# Patient Record
Sex: MALE | Race: WHITE
[De-identification: names, ages, dates, MRNs, and addresses within clinical notes are randomized per-mention and may not be internally consistent; named-entity substitution may affect disease eponyms.]

---

## 2019-11-07 ENCOUNTER — HEALTH MAINTENANCE LETTER (OUTPATIENT)
Age: 29
End: 2019-11-07

## 2020-12-06 ENCOUNTER — HEALTH MAINTENANCE LETTER (OUTPATIENT)
Age: 30
End: 2020-12-06

## 2021-09-25 ENCOUNTER — HEALTH MAINTENANCE LETTER (OUTPATIENT)
Age: 31
End: 2021-09-25

## 2022-01-15 ENCOUNTER — HEALTH MAINTENANCE LETTER (OUTPATIENT)
Age: 32
End: 2022-01-15

## 2023-01-07 ENCOUNTER — HEALTH MAINTENANCE LETTER (OUTPATIENT)
Age: 33
End: 2023-01-07

## 2023-04-22 ENCOUNTER — HEALTH MAINTENANCE LETTER (OUTPATIENT)
Age: 33
End: 2023-04-22

## 2024-12-07 ENCOUNTER — ANESTHESIA EVENT (OUTPATIENT)
Dept: SURGERY | Facility: CLINIC | Age: 34
End: 2024-12-07
Payer: COMMERCIAL

## 2024-12-07 ENCOUNTER — ANESTHESIA (OUTPATIENT)
Dept: SURGERY | Facility: CLINIC | Age: 34
End: 2024-12-07
Payer: COMMERCIAL

## 2024-12-07 ENCOUNTER — HOSPITAL ENCOUNTER (OUTPATIENT)
Facility: CLINIC | Age: 34
Discharge: HOME | End: 2024-12-07
Attending: STUDENT IN AN ORGANIZED HEALTH CARE EDUCATION/TRAINING PROGRAM
Payer: COMMERCIAL

## 2024-12-07 ENCOUNTER — HOSPITAL ENCOUNTER (EMERGENCY)
Facility: CLINIC | Age: 34
End: 2024-12-07
Payer: MEDICAID

## 2024-12-07 VITALS
DIASTOLIC BLOOD PRESSURE: 67 MMHG | BODY MASS INDEX: 26.72 KG/M2 | HEIGHT: 72 IN | WEIGHT: 197.3 LBS | TEMPERATURE: 97.9 F | SYSTOLIC BLOOD PRESSURE: 110 MMHG | RESPIRATION RATE: 14 BRPM | OXYGEN SATURATION: 94 % | HEART RATE: 118 BPM

## 2024-12-07 DIAGNOSIS — S05.32XD RUPTURED GLOBE OF LEFT EYE, SUBSEQUENT ENCOUNTER: ICD-10-CM

## 2024-12-07 DIAGNOSIS — H27.10 LENS DISLOCATION: ICD-10-CM

## 2024-12-07 PROCEDURE — 250N000011 HC RX IP 250 OP 636: Performed by: NURSE ANESTHETIST, CERTIFIED REGISTERED

## 2024-12-07 PROCEDURE — 360N000077 HC SURGERY LEVEL 4, PER MIN

## 2024-12-07 PROCEDURE — 370N000017 HC ANESTHESIA TECHNICAL FEE, PER MIN

## 2024-12-07 PROCEDURE — 250N000011 HC RX IP 250 OP 636: Performed by: ANESTHESIOLOGY

## 2024-12-07 PROCEDURE — 250N000011 HC RX IP 250 OP 636

## 2024-12-07 PROCEDURE — 250N000009 HC RX 250: Performed by: NURSE ANESTHETIST, CERTIFIED REGISTERED

## 2024-12-07 PROCEDURE — 65285 REPAIR OF EYE WOUND: CPT | Mod: LT

## 2024-12-07 PROCEDURE — 250N000009 HC RX 250

## 2024-12-07 PROCEDURE — 90471 IMMUNIZATION ADMIN: CPT

## 2024-12-07 PROCEDURE — 710N000012 HC RECOVERY PHASE 2, PER MINUTE

## 2024-12-07 PROCEDURE — 90714 TD VACC NO PRESV 7 YRS+ IM: CPT

## 2024-12-07 PROCEDURE — 258N000003 HC RX IP 258 OP 636: Performed by: NURSE ANESTHETIST, CERTIFIED REGISTERED

## 2024-12-07 PROCEDURE — 250N000011 HC RX IP 250 OP 636: Mod: JZ

## 2024-12-07 PROCEDURE — 99285 EMERGENCY DEPT VISIT HI MDM: CPT | Performed by: EMERGENCY MEDICINE

## 2024-12-07 PROCEDURE — 710N000010 HC RECOVERY PHASE 1, LEVEL 2, PER MIN

## 2024-12-07 PROCEDURE — 250N000025 HC SEVOFLURANE, PER MIN

## 2024-12-07 PROCEDURE — 99291 CRITICAL CARE FIRST HOUR: CPT | Performed by: EMERGENCY MEDICINE

## 2024-12-07 PROCEDURE — 999N000141 HC STATISTIC PRE-PROCEDURE NURSING ASSESSMENT

## 2024-12-07 PROCEDURE — 272N000001 HC OR GENERAL SUPPLY STERILE

## 2024-12-07 RX ORDER — LEVOFLOXACIN 5 MG/ML
500 INJECTION, SOLUTION INTRAVENOUS ONCE
Status: COMPLETED | OUTPATIENT
Start: 2024-12-07 | End: 2024-12-07

## 2024-12-07 RX ORDER — HYDROMORPHONE HYDROCHLORIDE 1 MG/ML
0.2 INJECTION, SOLUTION INTRAMUSCULAR; INTRAVENOUS; SUBCUTANEOUS EVERY 5 MIN PRN
Status: DISCONTINUED | OUTPATIENT
Start: 2024-12-07 | End: 2024-12-07 | Stop reason: HOSPADM

## 2024-12-07 RX ORDER — DEXAMETHASONE SODIUM PHOSPHATE 4 MG/ML
INJECTION, SOLUTION INTRA-ARTICULAR; INTRALESIONAL; INTRAMUSCULAR; INTRAVENOUS; SOFT TISSUE PRN
Status: DISCONTINUED | OUTPATIENT
Start: 2024-12-07 | End: 2024-12-07

## 2024-12-07 RX ORDER — PREDNISOLONE ACETATE 10 MG/ML
1 SUSPENSION/ DROPS OPHTHALMIC 4 TIMES DAILY
Qty: 5 ML | Refills: 0 | Status: SHIPPED | OUTPATIENT
Start: 2024-12-07

## 2024-12-07 RX ORDER — HYDROMORPHONE HYDROCHLORIDE 1 MG/ML
0.4 INJECTION, SOLUTION INTRAMUSCULAR; INTRAVENOUS; SUBCUTANEOUS EVERY 5 MIN PRN
Status: DISCONTINUED | OUTPATIENT
Start: 2024-12-07 | End: 2024-12-07 | Stop reason: HOSPADM

## 2024-12-07 RX ORDER — FENTANYL CITRATE 50 UG/ML
50 INJECTION, SOLUTION INTRAMUSCULAR; INTRAVENOUS EVERY 5 MIN PRN
Status: DISCONTINUED | OUTPATIENT
Start: 2024-12-07 | End: 2024-12-07 | Stop reason: HOSPADM

## 2024-12-07 RX ORDER — NALOXONE HYDROCHLORIDE 0.4 MG/ML
0.1 INJECTION, SOLUTION INTRAMUSCULAR; INTRAVENOUS; SUBCUTANEOUS
Status: DISCONTINUED | OUTPATIENT
Start: 2024-12-07 | End: 2024-12-07 | Stop reason: HOSPADM

## 2024-12-07 RX ORDER — FENTANYL CITRATE 50 UG/ML
25 INJECTION, SOLUTION INTRAMUSCULAR; INTRAVENOUS
Status: DISCONTINUED | OUTPATIENT
Start: 2024-12-07 | End: 2024-12-07 | Stop reason: HOSPADM

## 2024-12-07 RX ORDER — FENTANYL CITRATE 50 UG/ML
25 INJECTION, SOLUTION INTRAMUSCULAR; INTRAVENOUS EVERY 5 MIN PRN
Status: DISCONTINUED | OUTPATIENT
Start: 2024-12-07 | End: 2024-12-07 | Stop reason: HOSPADM

## 2024-12-07 RX ORDER — SODIUM CHLORIDE, SODIUM LACTATE, POTASSIUM CHLORIDE, CALCIUM CHLORIDE 600; 310; 30; 20 MG/100ML; MG/100ML; MG/100ML; MG/100ML
INJECTION, SOLUTION INTRAVENOUS CONTINUOUS
Status: DISCONTINUED | OUTPATIENT
Start: 2024-12-07 | End: 2024-12-07 | Stop reason: HOSPADM

## 2024-12-07 RX ORDER — ONDANSETRON 2 MG/ML
4 INJECTION INTRAMUSCULAR; INTRAVENOUS EVERY 30 MIN PRN
Status: DISCONTINUED | OUTPATIENT
Start: 2024-12-07 | End: 2024-12-07 | Stop reason: HOSPADM

## 2024-12-07 RX ORDER — ATROPINE SULFATE 10 MG/ML
SOLUTION/ DROPS OPHTHALMIC PRN
Status: DISCONTINUED | OUTPATIENT
Start: 2024-12-07 | End: 2024-12-07 | Stop reason: HOSPADM

## 2024-12-07 RX ORDER — ONDANSETRON 2 MG/ML
INJECTION INTRAMUSCULAR; INTRAVENOUS PRN
Status: DISCONTINUED | OUTPATIENT
Start: 2024-12-07 | End: 2024-12-07

## 2024-12-07 RX ORDER — OXYCODONE HYDROCHLORIDE 5 MG/1
5 TABLET ORAL
Status: DISCONTINUED | OUTPATIENT
Start: 2024-12-07 | End: 2024-12-07 | Stop reason: HOSPADM

## 2024-12-07 RX ORDER — RISPERIDONE 2 MG/1
2 TABLET ORAL EVERY MORNING
COMMUNITY

## 2024-12-07 RX ORDER — FENTANYL CITRATE 50 UG/ML
INJECTION, SOLUTION INTRAMUSCULAR; INTRAVENOUS PRN
Status: DISCONTINUED | OUTPATIENT
Start: 2024-12-07 | End: 2024-12-07

## 2024-12-07 RX ORDER — RISPERIDONE 1 MG/1
1 TABLET ORAL AT BEDTIME
COMMUNITY

## 2024-12-07 RX ORDER — ATROPINE SULFATE 10 MG/ML
1 SOLUTION/ DROPS OPHTHALMIC 2 TIMES DAILY
Qty: 5 ML | Refills: 0 | Status: SHIPPED | OUTPATIENT
Start: 2024-12-07

## 2024-12-07 RX ORDER — MOXIFLOXACIN HYDROCHLORIDE 400 MG/250ML
400 INJECTION, SOLUTION INTRAVENOUS ONCE
Status: DISCONTINUED | OUTPATIENT
Start: 2024-12-07 | End: 2024-12-07

## 2024-12-07 RX ORDER — ONDANSETRON 4 MG/1
4 TABLET, ORALLY DISINTEGRATING ORAL EVERY 8 HOURS PRN
Qty: 12 TABLET | Refills: 0 | Status: SHIPPED | OUTPATIENT
Start: 2024-12-07

## 2024-12-07 RX ORDER — CYCLOPENTOLAT/TROPIC/PHENYLEPH 1%-1%-2.5%
DROPS (EA) OPHTHALMIC (EYE) PRN
Status: DISCONTINUED | OUTPATIENT
Start: 2024-12-07 | End: 2024-12-07 | Stop reason: HOSPADM

## 2024-12-07 RX ORDER — ONDANSETRON 4 MG/1
4 TABLET, ORALLY DISINTEGRATING ORAL EVERY 30 MIN PRN
Status: DISCONTINUED | OUTPATIENT
Start: 2024-12-07 | End: 2024-12-07 | Stop reason: HOSPADM

## 2024-12-07 RX ORDER — MOXIFLOXACIN 5 MG/ML
1 SOLUTION/ DROPS OPHTHALMIC 4 TIMES DAILY
Qty: 3 ML | Refills: 0 | Status: SHIPPED | OUTPATIENT
Start: 2024-12-07 | End: 2024-12-23

## 2024-12-07 RX ORDER — KETOROLAC TROMETHAMINE 30 MG/ML
INJECTION, SOLUTION INTRAMUSCULAR; INTRAVENOUS PRN
Status: DISCONTINUED | OUTPATIENT
Start: 2024-12-07 | End: 2024-12-07

## 2024-12-07 RX ORDER — DEXMEDETOMIDINE HYDROCHLORIDE 4 UG/ML
INJECTION, SOLUTION INTRAVENOUS PRN
Status: DISCONTINUED | OUTPATIENT
Start: 2024-12-07 | End: 2024-12-07

## 2024-12-07 RX ORDER — SODIUM CHLORIDE, SODIUM LACTATE, POTASSIUM CHLORIDE, CALCIUM CHLORIDE 600; 310; 30; 20 MG/100ML; MG/100ML; MG/100ML; MG/100ML
INJECTION, SOLUTION INTRAVENOUS CONTINUOUS PRN
Status: DISCONTINUED | OUTPATIENT
Start: 2024-12-07 | End: 2024-12-07

## 2024-12-07 RX ORDER — FLUVOXAMINE MALEATE 50 MG
50 TABLET ORAL AT BEDTIME
COMMUNITY

## 2024-12-07 RX ORDER — DEXAMETHASONE SODIUM PHOSPHATE 4 MG/ML
4 INJECTION, SOLUTION INTRA-ARTICULAR; INTRALESIONAL; INTRAMUSCULAR; INTRAVENOUS; SOFT TISSUE
Status: DISCONTINUED | OUTPATIENT
Start: 2024-12-07 | End: 2024-12-07 | Stop reason: HOSPADM

## 2024-12-07 RX ORDER — BALANCED SALT SOLUTION 6.4; .75; .48; .3; 3.9; 1.7 MG/ML; MG/ML; MG/ML; MG/ML; MG/ML; MG/ML
SOLUTION OPHTHALMIC PRN
Status: DISCONTINUED | OUTPATIENT
Start: 2024-12-07 | End: 2024-12-07 | Stop reason: HOSPADM

## 2024-12-07 RX ORDER — PROPOFOL 10 MG/ML
INJECTION, EMULSION INTRAVENOUS PRN
Status: DISCONTINUED | OUTPATIENT
Start: 2024-12-07 | End: 2024-12-07

## 2024-12-07 RX ORDER — MECLIZINE HCL 25MG 25 MG/1
25 TABLET, CHEWABLE ORAL ONCE
Status: DISCONTINUED | OUTPATIENT
Start: 2024-12-07 | End: 2024-12-07 | Stop reason: HOSPADM

## 2024-12-07 RX ORDER — LIDOCAINE HYDROCHLORIDE 20 MG/ML
INJECTION, SOLUTION INFILTRATION; PERINEURAL PRN
Status: DISCONTINUED | OUTPATIENT
Start: 2024-12-07 | End: 2024-12-07

## 2024-12-07 RX ORDER — OXYCODONE HYDROCHLORIDE 10 MG/1
10 TABLET ORAL
Status: DISCONTINUED | OUTPATIENT
Start: 2024-12-07 | End: 2024-12-07 | Stop reason: HOSPADM

## 2024-12-07 RX ADMIN — DEXMEDETOMIDINE HYDROCHLORIDE 8 MCG: 100 INJECTION, SOLUTION INTRAVENOUS at 07:54

## 2024-12-07 RX ADMIN — SODIUM CHLORIDE, POTASSIUM CHLORIDE, SODIUM LACTATE AND CALCIUM CHLORIDE: 600; 310; 30; 20 INJECTION, SOLUTION INTRAVENOUS at 05:46

## 2024-12-07 RX ADMIN — SUGAMMADEX 200 MG: 100 INJECTION, SOLUTION INTRAVENOUS at 08:34

## 2024-12-07 RX ADMIN — ONDANSETRON 4 MG: 2 INJECTION INTRAMUSCULAR; INTRAVENOUS at 09:08

## 2024-12-07 RX ADMIN — FENTANYL CITRATE 100 MCG: 50 INJECTION INTRAMUSCULAR; INTRAVENOUS at 05:53

## 2024-12-07 RX ADMIN — CLOSTRIDIUM TETANI TOXOID ANTIGEN (FORMALDEHYDE INACTIVATED) AND CORYNEBACTERIUM DIPHTHERIAE TOXOID ANTIGEN (FORMALDEHYDE INACTIVATED) 0.5 ML: 5; 2 INJECTION, SUSPENSION INTRAMUSCULAR at 09:18

## 2024-12-07 RX ADMIN — LEVOFLOXACIN 500 MG: 5 INJECTION, SOLUTION INTRAVENOUS at 06:09

## 2024-12-07 RX ADMIN — KETOROLAC TROMETHAMINE 30 MG: 30 INJECTION, SOLUTION INTRAMUSCULAR at 08:08

## 2024-12-07 RX ADMIN — DEXAMETHASONE SODIUM PHOSPHATE 8 MG: 4 INJECTION, SOLUTION INTRAMUSCULAR; INTRAVENOUS at 06:32

## 2024-12-07 RX ADMIN — LIDOCAINE HYDROCHLORIDE 100 MG: 20 INJECTION, SOLUTION INFILTRATION; PERINEURAL at 05:53

## 2024-12-07 RX ADMIN — DEXMEDETOMIDINE HYDROCHLORIDE 4 MCG: 100 INJECTION, SOLUTION INTRAVENOUS at 08:07

## 2024-12-07 RX ADMIN — Medication 50 MG: at 05:53

## 2024-12-07 RX ADMIN — ONDANSETRON 4 MG: 2 INJECTION INTRAMUSCULAR; INTRAVENOUS at 07:49

## 2024-12-07 RX ADMIN — PROCHLORPERAZINE EDISYLATE 5 MG: 5 INJECTION INTRAMUSCULAR; INTRAVENOUS at 10:47

## 2024-12-07 RX ADMIN — DEXMEDETOMIDINE HYDROCHLORIDE 8 MCG: 100 INJECTION, SOLUTION INTRAVENOUS at 08:12

## 2024-12-07 RX ADMIN — MIDAZOLAM 2 MG: 1 INJECTION INTRAMUSCULAR; INTRAVENOUS at 05:46

## 2024-12-07 RX ADMIN — PROPOFOL 200 MG: 10 INJECTION, EMULSION INTRAVENOUS at 05:53

## 2024-12-07 RX ADMIN — FENTANYL CITRATE 50 MCG: 50 INJECTION INTRAMUSCULAR; INTRAVENOUS at 08:32

## 2024-12-07 RX ADMIN — PROCHLORPERAZINE EDISYLATE 5 MG: 5 INJECTION INTRAMUSCULAR; INTRAVENOUS at 09:56

## 2024-12-07 ASSESSMENT — ACTIVITIES OF DAILY LIVING (ADL)
ADLS_ACUITY_SCORE: 47
ADLS_ACUITY_SCORE: 41
ADLS_ACUITY_SCORE: 47
ADLS_ACUITY_SCORE: 41
ADLS_ACUITY_SCORE: 41
ADLS_ACUITY_SCORE: 47
ADLS_ACUITY_SCORE: 41

## 2024-12-07 ASSESSMENT — COLUMBIA-SUICIDE SEVERITY RATING SCALE - C-SSRS
6. HAVE YOU EVER DONE ANYTHING, STARTED TO DO ANYTHING, OR PREPARED TO DO ANYTHING TO END YOUR LIFE?: NO
1. IN THE PAST MONTH, HAVE YOU WISHED YOU WERE DEAD OR WISHED YOU COULD GO TO SLEEP AND NOT WAKE UP?: NO
2. HAVE YOU ACTUALLY HAD ANY THOUGHTS OF KILLING YOURSELF IN THE PAST MONTH?: NO

## 2024-12-07 NOTE — CONSULTS
OPHTHALMOLOGY CONSULT NOTE  12/07/24  Physician Attestation   I saw this patient with the resident on Dec 7, 2024 and agree with the findings and plan of care as documented in the note with the following additions/modifications:    Assessment:   #Ruptured globe, left eye - corneal laceration with iris prolapse    Plan:  -Start moxifloxacin 400 mg IV   -Zofran 4 mg q 4 hours for nausea   -Mtz shield over eye  -Avoid eye rubbing, straining or pressure on eye  -Tetanus vaccine ( to be injected in the operating room due to patients anxiety)  -To OR for surgical repair, consent was obtained and risks and benefits were described in detail to the Mom and Dad (Laurie and Alonzo) who are the medical decision makers for Guilherme Silverio  -Intravitreal antibiotics, vancomycin and ceftazidime, to be administered intraoperatively  -Will exam patient later in the afternoon on 12/7/2024  -Instructions to be provided by surgeons after the procedure - Will see the patient tomorrow at 11 am for 1st post operative visit    We discussed return precautions and modes of contacting our service for any changing symptoms or other questions/concerns.     Yves Figueroa MD   Fellow, Neuro-ophthalmology     Patient: Guilherme Silverio  ASSESSMENT/PLAN:     Guilherme Silverio is a 34 year old male who presented with glass injury to the left eye.    #Ruptured globe, left eye  Patient broke a glass and a shard went into this left eye, the right eye was unaffected. CT at outside hospital ([facility]) shows intact globe, no foreign body, but no identifiable lens. VA was 20/20 right eye and possibly count fingers in the left eye, IOP deferred due to patient discomfort, EOMs grossly full, patient lacked understanding of confrontational fields. Anterior segment exam of the right eye with a deep chamber and no stain uptake - left eye with full thickness superior corneal laceration and completely flat chamber, was unable to get fully view of  bulbar conjunctiva to assess scleral related injury.    PLAN:   -Start moxifloxacin 400 mg IV   -Intravitreal antibiotics, vancomycin and ceftazidime, to be administered intraoperatively  -Zofran 4 mg q 4 hours for nausea   -Mtz shield over eye  -Avoid eye rubbing or pressure on eye  -Avoid straining  -Tetanus vaccine if not up to date  -To OR for surgical repair, consent was obtained and risks and benefits were described in detail to the Mom and Dad (Laurie and Alonzo) who are the medical decision makers for Guilherme Silverio.  -Instructions to be provided by surgeons after the procedure  -Will exam patient later in the afternoon on 12/7/2024    It is our pleasure to participate in this patient's care and treatment. Please contact us with any further questions or concerns.    Discussed with Dr. Yves Chinchilla who agreed with this assessment and plan.     Ophthalmology will continue to follow inpatient. Please contact ophthalmologist on call with any questions or concerns. We appreciate your care of this patient.    Thank you for entrusting us with your care  Reno Bales MD, PGY2  Ophthalmology Resident  [facility]    HISTORY OF PRESENTING ILLNESS:     Guilherme Silverio is a 34 year old male who presented on 12/7/2024 after transfer from outside hospital. Around 7:30 pm on 12/7/2024 the patient smashed a drinking glass and some of went into his eye. Went to outside ED in [facility] where a CT was performed, this did not show a foreign body or ruptured globe. He was sent over here for further evaluation. Pain in the left eye - family said he has never had problems in the past.    10+ review of systems were otherwise negative except for that which has been stated above.    OCULAR/MEDICAL/SURGICAL HISTORIES:     Past Ocular History:   Last eye exam: over 5 years ago   Previously diagnosed ocular conditions: None  Prior eye surgery/laser: None  Contact lens wear: None  Glasses: None  Eyedrops:  None    Pertinent Systemic Medications:   None pertinent    Allergies:  Mom denies that patient has any allergy to antibiotics, latex, or anesthesia     Past Medical History:  Past Medical History:   Diagnosis Date    Acne     Autism     Constipation     Diarrhea     Rectal prolapse        Past Surgical History:   Past Surgical History:   Procedure Laterality Date    DENTAL SURGERY      HC TOOTH EXTRACTION W/FORCEP      NO HISTORY OF SURGERY      PE TUBES      ZZHC LAP,INGUINAL HERNIA REPR,RECUR         Family History:  No history of macular degeneration or glaucoma    Social History:  No tobacco use    EXAMINATION:     Slit Lamp and Fundus Exam       External Exam         Right Left    External Normal Normal              Slit Lamp Exam         Right Left    Lids/Lashes Normal Normal    Conjunctiva/Sclera White and quiet Difficult view    Cornea Clear Superior laceration (see picture)    Anterior Chamber Deep and quiet flat    Iris Round and reactive     Lens Clear     Anterior Vitreous Normal               Fundus Exam       Fundus to be examined intraoperatively                    Labs/Studies/Imaging Performed    CT Head 12/6/2024  Outside CT of Head without formal read here at [facility] - The globe appears intact, though it appears the lens is missing.      Reno Bales MD  Resident Physician, PGY2  Department of Ophthalmology  12/07/24 3:10 AM

## 2024-12-07 NOTE — ED PROVIDER NOTES
[facility] EMERGENCY DEPARTMENT ([facility])    12/07/24      ED PROVIDER NOTE    History     Chief Complaint   Patient presents with    Eye Injury     Was referred here from [facility] due to L eye injury. Pt broke a glasses, a piece of glasses may get in the eye.      The history is provided by medical records and a parent.     Guilherme Silverio is a 34 year old male with a history of active autistic disorder who presents to the ED for evaluation of a left eye injury.  Patient was initially evaluated at [facility] where a CT was done which showed a missing lens in the eye.  History is provided by parents as patient is unable to provide history due to developmental delay.  Parents report that he threw a glass which shattered and they believe he got a shard of glass in his eye.  He has had pain and swelling surrounding the eye.  He has had normal vision in both eyes up until now.  He is not quite able to describe his vision, but from what the parents were able to ascertain he is having difficulty reading letters but is able to see colors still.  He last ate around 11 PM.  He is not anticoagulated.        Past Medical History  Past Medical History:   Diagnosis Date    Acne     Autism     Constipation     Diarrhea     Rectal prolapse      Past Surgical History:   Procedure Laterality Date    DENTAL SURGERY      HC LAP,INGUINAL HERNIA REPR,RECUR      HC TOOTH EXTRACTION W/FORCEP      NO HISTORY OF SURGERY      PE TUBES       ARIPiprazole (ABILIFY) 15 MG tablet  clindamycin (CLEOCIN T) 1 % external solution  diazepam (VALIUM) 5 MG tablet  gabapentin (NEURONTIN) 300 MG capsule  melatonin 3 MG tablet  sertraline (ZOLOFT) 50 MG tablet  tretinoin (RETIN-A) 0.01 % gel  ziprasidone (GEODON) 20 MG capsule  ziprasidone (GEODON) 40 MG capsule      No Known Allergies  Family History  No family history on file.  Social History   Social History     Tobacco Use    Smoking status: Never   Substance Use Topics  "   Alcohol use: No    Drug use: No      Past medical history, past surgical history, medications, allergies, family history, and social history were reviewed with the patient. No additional pertinent items.     A medically appropriate review of systems was performed with pertinent positives and negatives noted in the HPI, and all other systems negative.    Physical Exam      Physical Exam  Vitals and nursing note reviewed.   Constitutional:       General: He is not in acute distress.     Appearance: Normal appearance. He is not toxic-appearing.   HENT:      Head: Atraumatic.   Eyes:      General: No scleral icterus.     Conjunctiva/sclera: Conjunctivae normal.   Cardiovascular:      Rate and Rhythm: Normal rate.      Heart sounds: Normal heart sounds.   Pulmonary:      Effort: Pulmonary effort is normal. No respiratory distress.      Breath sounds: Normal breath sounds.   Abdominal:      Palpations: Abdomen is soft.      Tenderness: There is no abdominal tenderness.   Musculoskeletal:         General: No deformity.      Cervical back: Neck supple.   Skin:     General: Skin is warm.   Neurological:      Mental Status: He is alert.       ED Course, Procedures, & Data      Procedures       {ED Course Selections (Optional):135283}  {ED Sepsis CMS Documentation (Optional):114579::\" \"}       No results found for any visits on 12/07/24.  Medications - No data to display  Labs Ordered and Resulted from Time of ED Arrival to Time of ED Departure - No data to display  No orders to display          {Critical Care Performed?:615110}    Assessment & Plan    Guilherme Silverio is a 34 year old male with a history of active autistic disorder who presents to the ED from [facility] for evaluation of a left eye injury.     I have reviewed the nursing notes. I have reviewed the findings, diagnosis, plan and need for follow up with the patient.    New Prescriptions    No medications on file       Final diagnoses:   None     I, " Jeanne Beaver, am serving as a trained medical scribe to document services personally performed by Ying Meier MD based on the provider's statements to me on December 7, 2024.  This document has been checked and approved by the attending provider.    I, Ying Meier MD, was physically present and have reviewed and verified the accuracy of this note documented by Jeanne Beaver medical scribe.      Ying Meier MD  [facility] EMERGENCY DEPARTMENT  12/7/2024     Ying Meier MD  12/13/24 0843       Ying Meier MD  12/13/24 0844

## 2024-12-07 NOTE — BRIEF OP NOTE
[facility]    Brief Operative Note    Pre-operative diagnosis: Ruptured globe of left eye, initial encounter [S05.32XA]  Post-operative diagnosis Same as pre-operative diagnosis    Procedure: Repair ruptured globe, Left - Eye    Surgeon: Surgeons and Role:     * Yves Huff MD - Primary     * Eliud Eric MD - Resident - Assisting  Anesthesia: General   Estimated Blood Loss: Minimal    Drains: None  Specimens: * No specimens in log *  Findings:   Irregular corneal laceration with iris in the wound  Complications: None.  Implants: * No implants in log *        Eliud Eric MD  Resident Physician, PGY-3  Department of Ophthalmology

## 2024-12-07 NOTE — ANESTHESIA PROCEDURE NOTES
Airway       Patient location during procedure: OR       Procedure Start/Stop Times: 12/7/2024 5:55 AM  Staff -        CRNA: Toña yRan APRN CRNA       Performed By: CRNA  Consent for Airway        Urgency: elective  Indications and Patient Condition       Indications for airway management: juan-procedural       Induction type:intravenous       Mask difficulty assessment: 1 - vent by mask    Final Airway Details       Final airway type: endotracheal airway       Successful airway: ETT - single  Endotracheal Airway Details        ETT size (mm): 8.0       Cuffed: yes       Successful intubation technique: direct laryngoscopy and video laryngoscopy       VL Blade Size: Glidescope 4       Grade View of Cords: 1       Adjucts: stylet       Position: Right       Measured from: gums/teeth       Secured at (cm): 25       Bite block used: None    Post intubation assessment        Placement verified by: capnometry, equal breath sounds and chest rise        Number of attempts at approach: 1       Secured with: pink tape       Ease of procedure: easy       Dentition: Intact and Unchanged    Medication(s) Administered   Medication Administration Time: 12/7/2024 5:55 AM

## 2024-12-07 NOTE — ANESTHESIA CARE TRANSFER NOTE
Patient: Guilherme Silverio    Procedure: Procedure(s):  Repair ruptured globe       Diagnosis: Ruptured globe of left eye, initial encounter [S05.32XA]  Diagnosis Additional Information: No value filed.    Anesthesia Type:   General     Note:    Oropharynx: oropharynx clear of all foreign objects, spontaneously breathing and oral airway in place  Level of Consciousness: drowsy  Oxygen Supplementation: face mask  Level of Supplemental Oxygen (L/min / FiO2): 8  Independent Airway: airway patency satisfactory and stable  Dentition: dentition unchanged  Vital Signs Stable: post-procedure vital signs reviewed and stable  Report to RN Given: handoff report given  Patient transferred to: PACU    Handoff Report: Identifed the Patient, Identified the Reponsible Provider, Reviewed the pertinent medical history, Discussed the surgical course, Reviewed Intra-OP anesthesia mangement and issues during anesthesia, Set expectations for post-procedure period and Allowed opportunity for questions and acknowledgement of understanding      Vitals:  Vitals Value Taken Time   /63 12/07/24 0846   Temp     Pulse 86 12/07/24 0856   Resp 20 12/07/24 0856   SpO2 93 % 12/07/24 0856   Vitals shown include unfiled device data.    Electronically Signed By: TYLOR Brand CRNA  December 7, 2024  8:56 AM

## 2024-12-07 NOTE — OP NOTE
Pre-operative diagnosis:  1. Irregular corneal laceration with uveal prolapse, left eye      Post-operative diagnosis: Same      Procedure performed:  1. Repair of ruptured globe, left eye  2. Iris reposition, left eye  2. Examination under anesthesia, right eye      Attending: Yves Figueroa MD  Assistant: Eliud Eric MD     Anesthesia: General      Estimated blood loss: Less than 1 mL    Indication for procedure: The patient is a 34 year old male who was transferred from an outside hospital for ruptured globe on 12/07/24 . Examination at bedside revealed a left corneal laceration with iris/uvea within the wound. CT orbits revealed no IOFB and possible absence of the crystalline lens. The risks, benefits, alternatives, and considerations of surgery including infection, inflammation, bleeding, high or low eye pressure, loss of vision, loss of eye, and need for additional procedures were reviewed, and the patient's guardians elected to proceed with the procedure.       Description of procedure: The patient was met in the pre-operative holding area where the history, physical, and consent forms were reviewed and verified. The left eye was marked as the correct operative site. The patient was taken to the operating room and placed under general anesthesia. The operative eye was prepped and draped in sterile technique for ophthalmic surgery. A Ursula lid speculum was inserted into the upper and lower eyelids for adequate exposure of the globe. The microscope was brought into position, and a timeout was completed.     A corneal laceration was located at 9 o clock starting 1 mm from the limbus extending 5 mm towards the visual axis with an oblique turn superior for another 3 mm, with 2 triangles making it irregular. The lens capsule appeared to be intact. The laceration was approximated using 10, 10-0 nylon sutures. Once the wound was re-approximated a paracentesis was made superotemporally and healon was  used to reform the chamber and reposit the iris into its anatomic position. Sutures were buried and BSS was used to assess for leaks. 3 sutures were removed and replaced to achieve a water tight closure which was confirmed with brandon testing. The paracentesis was closed with 1, 10-0 nylon suture. Pupil was observed round and centered at the end of the surgery, through dense haze from stromal corneal edema. No vitreous strands were seen in the wound or the paracentesis using a weck-cell.     Following closing of the paracentesis, balanced salt solution was used to irrigate the eye and was used to re-inflate the globe. A small bubble of air was injected into the AC. The incision was sealed and checked with fluorescein strip painting. Intravitreal vancomycin (1 mg/0.1mL) and ceftazadime (2.25 mg/0.1mL) were injected through the pars plana. Again no vitreous strands were seen in the wound or the paracentesis using a weck-cell.     Subconjunctival ceftazidime and dexamethasone were injected in the inferior fornix. The IOP was acceptable by palpation. A bandage contact lens was placed followed by atropine and maxitrol ointment.       The lid speculum was removed.      The surgical drapes were removed.      The non-operative eye was dilated with tropicamide. Examination revealed clear cornea, white conjunctiva, deep and quiet anterior chamber, dilated and round iris, clear lens, clear vitreous, normal disc, normal fundus. IOP was 18. No foreign bodies were identified with lid eversion.      The patient was patched and shielded.       The patient was then brought out of general anesthesia and taken to the postoperative recovery unit in stable condition with follow up tomorrow.     The patient will be sent home with prednisolone, atropine and moxifloxacin eye drops. These will be started after the post-operative day 1 visit. The eye patch should be left in place until that time.     Eliud Eric MD  Resident  Physician, PGY-3  Department of Ophthalmology     Yves Figueroa MD   PGY-5 Neuro-ophthalmology Fellow  Department of Ophthalmology   [facility]

## 2024-12-07 NOTE — ANESTHESIA POSTPROCEDURE EVALUATION
Patient: Guilherme Silverio    Procedure: Procedure(s):  Repair ruptured globe       Anesthesia Type:  General    Note:  Disposition: Outpatient   Postop Pain Control: Uneventful            Sign Out: Well controlled pain   PONV: No   Neuro/Psych: Uneventful            Sign Out: Acceptable/Baseline neuro status   Airway/Respiratory: Uneventful            Sign Out: Acceptable/Baseline resp. status   CV/Hemodynamics: Uneventful            Sign Out: Acceptable CV status; No obvious hypovolemia; No obvious fluid overload   Other NRE: NONE   DID A NON-ROUTINE EVENT OCCUR? No       Last vitals:  Vitals Value Taken Time   /65 12/07/24 0915   Temp 36.4  C (97.5  F) 12/07/24 0915   Pulse 108 12/07/24 0911   Resp 14 12/07/24 0915   SpO2 93 % 12/07/24 0915   Vitals shown include unfiled device data.    Electronically Signed By: Savi Patel MD  December 7, 2024  10:09 AM

## 2024-12-07 NOTE — ANESTHESIA PREPROCEDURE EVALUATION
"Anesthesia Pre-Procedure Evaluation    Patient: Guilherme Silverio   MRN: [id number] : 1990        Procedure : Procedure(s):  Repair ruptured globe  Repair iris          Past Medical History:   Diagnosis Date    Acne     Autism     Constipation     Diarrhea     Rectal prolapse       Past Surgical History:   Procedure Laterality Date    DENTAL SURGERY      HC TOOTH EXTRACTION W/FORCEP      NO HISTORY OF SURGERY      PE TUBES      ZZHC LAP,INGUINAL HERNIA REPR,RECUR        No Known Allergies   Social History     Tobacco Use    Smoking status: Never    Smokeless tobacco: Not on file   Substance Use Topics    Alcohol use: No      Wt Readings from Last 1 Encounters:   24 89.5 kg (197 lb 4.8 oz)        Anesthesia Evaluation            ROS/MED HX  ENT/Pulmonary:  - neg pulmonary ROS     Neurologic: Comment: Austism    (+)                         Developmental delay,       Cardiovascular:  - neg cardiovascular ROS     METS/Exercise Tolerance:     Hematologic:  - neg hematologic  ROS     Musculoskeletal:  - neg musculoskeletal ROS     GI/Hepatic:  - neg GI/hepatic ROS     Renal/Genitourinary:  - neg Renal ROS     Endo:  - neg endo ROS     Psychiatric/Substance Use: Comment: OCD      Infectious Disease:  - neg infectious disease ROS     Malignancy:       Other:            Physical Exam    Airway           Mouth opening: < 3 cm    Respiratory Devices and Support         Dental       (+) Modest Abnormalities - crowns, retainers, 1 or 2 missing teeth      Cardiovascular   cardiovascular exam normal          Pulmonary   pulmonary exam normal                OUTSIDE LABS:  CBC: No results found for: \"WBC\", \"HGB\", \"HCT\", \"PLT\"  BMP: No results found for: \"NA\", \"POTASSIUM\", \"CHLORIDE\", \"CO2\", \"BUN\", \"CR\", \"GLC\"  COAGS: No results found for: \"PTT\", \"INR\", \"FIBR\"  POC: No results found for: \"BGM\", \"HCG\", \"HCGS\"  HEPATIC: No results found for: \"ALBUMIN\", \"PROTTOTAL\", \"ALT\", \"AST\", \"GGT\", \"ALKPHOS\", \"BILITOTAL\", " "\"BILIDIRECT\", \"NICKI\"  OTHER: No results found for: \"PH\", \"LACT\", \"A1C\", \"ORION\", \"PHOS\", \"MAG\", \"LIPASE\", \"AMYLASE\", \"TSH\", \"T4\", \"T3\", \"CRP\", \"SED\"    Anesthesia Plan    ASA Status:  2, emergent    NPO Status:  NPO Appropriate    Anesthesia Type: General.     - Airway: ETT   Induction: Intravenous, Propofol.   Maintenance: Balanced.        Consents    Anesthesia Plan(s) and associated risks, benefits, and realistic alternatives discussed. Questions answered and patient/representative(s) expressed understanding.     - Discussed: Risks, Benefits and Alternatives for the PROCEDURE were discussed     - Discussed with:  Patient            Postoperative Care    Pain management: Oral pain medications, IV analgesics, Multi-modal analgesia.   PONV prophylaxis: Ondansetron (or other 5HT-3), Dexamethasone or Solumedrol, Background Propofol Infusion     Comments:               Ritesh Martinez MD    I have reviewed the pertinent notes and labs in the chart from the past 30 days and (re)examined the patient.  Any updates or changes from those notes are reflected in this note.                         # Overweight: Estimated body mass index is 26.76 kg/m  as calculated from the following:    Height as of this encounter: 1.829 m (6').    Weight as of this encounter: 89.5 kg (197 lb 4.8 oz).             "

## 2024-12-07 NOTE — ED TRIAGE NOTES
Threw glass on ground a shard may have gotten into left eye.     Triage Assessment (Adult)       Row Name 12/07/24 0156          Triage Assessment    Airway WDL WDL        Respiratory WDL    Respiratory WDL WDL        Skin Circulation/Temperature WDL    Skin Circulation/Temperature WDL WDL        Peripheral/Neurovascular WDL    Peripheral Neurovascular WDL WDL        Cognitive/Neuro/Behavioral WDL    Cognitive/Neuro/Behavioral WDL WDL

## 2024-12-07 NOTE — DISCHARGE INSTRUCTIONS
"Please refer to your doctor's (or  bottle) recommendations for dosing per frequency of Tylenol (acetaminophen) and ibuprofen.   Last dose of Tylenol given at not given yet. Next dose due at anytime.  Last dose of NSAID (toradol or ibuprofen) given at 8:08am. Next dose due at 2pm.    Continue to alternate Tylenol and ibuprofen every 3 hours as needed for comfort.    To contact a doctor, call Dr. Renée Figueroa [phone] or:     [phone] and ask for the Resident On Call for:          Ophthalmology (answered 24 hours a day)   Emergency Departments:  [facility] Emergency Department: [phone]     [facility] Emergency Department: [phone]     Eye Surgery Post-Op Instructions      Your followup appointment is listed below:    11 am [facility] [address] [place] [address]     MEDICATIONS:     You are going home with several eye drops.  Start using your drops after your visit tomorrow   Wait 2 minutes between eye drops and always use ointments last  Bring all of your eye drops and ointments to follow-up appointments.      Prednisolone    4 times per day  Left eye         Moxifloxacin    4 times per day   Left eye         Atropine    4 times per day   Left eye       For mild to moderate pain, we recommend acetaminophen (e.g., Tylenol) 650 mg by mouth four times a day as needed. We have also prescribed a small quantity of pain medication (oxycodone) for pain that persists despite acetaminophen.    We have prescribed an anti-nausea medication that you may take as needed.               EYE PROTECTION:     Wear normal glasses, sunglasses, or an eye shield at all times over the eye that had surgery.  Wear an eye shield with a piece of tape from the forehead to the cheek at night and any time you are sleeping.    To reduce risk of injury to your better-seeing eye, we recommend that you wear glasses made from polycarbonate (or \"shatter-resistant\") material at all times while " awake. Full wrap-around safety goggles should be worn whenever working with machinery or in brody/dirty environments.    ACTIVITY:     Do NOT bend over below waist - bend at knees.    Do NOT lift more than 10 lbs, which is about the weight of a gallon milk.    Do NOT strain or do anything that would make your face turn red.  Open your mouth when coughing or sneezing.  Take a stool softener for constipation.    It is okay to shower, but avoid getting water directly in your eye.  No swimming.  You may return to normal activities only after being cleared by your surgeon.  You may resume the same diet you were consuming prior to surgery.     WHAT TO EXPECT AND WHEN TO CALL US:    Bruising and a small amount of bleeding are normal, including pink/bloody tears/ discharge.   Mild to moderate soreness  Itching or a sensation of grit in the eye    Please call for the following:       - Increasing pain, redness, or sensitivity to light  - Worsening vision  - Flashes of light (as if someone were taking pictures of you)  - Showers of new floaters (like a cloud of bugs in your vision)  - A curtain moving across your vision  - Nausea, or feeling that you have to throw up, that persists and interferes with your ability to keep liquids down    For questions or concerns, please call [phone] If after hours, follow the voicemail prompts for  Eye Emergency,  and ask for the  Eye doctor on call.   You may leave a message for prescription renewal requests (check with your pharmacy first to see if any refills are remaining).           Thank you for entrusting us with your eye care.

## 2024-12-07 NOTE — PROGRESS NOTES
Report given to Darshana RODRIGUEZ. Pt to be transported to [facility] PACU.

## 2024-12-08 ENCOUNTER — OFFICE VISIT (OUTPATIENT)
Dept: OPHTHALMOLOGY | Facility: CLINIC | Age: 34
End: 2024-12-08
Payer: COMMERCIAL

## 2024-12-08 DIAGNOSIS — Z98.890 POSTOPERATIVE EYE STATE: Primary | ICD-10-CM

## 2024-12-08 ASSESSMENT — VISUAL ACUITY: METHOD: SNELLEN - LINEAR

## 2024-12-08 ASSESSMENT — TONOMETRY: IOP_METHOD: PALPATION

## 2024-12-08 ASSESSMENT — EXTERNAL EXAM - RIGHT EYE: OD_EXAM: NORMAL

## 2024-12-08 ASSESSMENT — SLIT LAMP EXAM - LIDS: COMMENTS: NORMAL

## 2024-12-08 ASSESSMENT — EXTERNAL EXAM - LEFT EYE: OS_EXAM: NORMAL

## 2024-12-08 NOTE — PROGRESS NOTES
Patient: Guilherme Silverio  ASSESSMENT/PLAN:   #Ruptured globe, left eye - corneal laceration with iris prolapse due to broken glass.     POD1: Doing well, anterior chamber is formed with no leak. Early traumatic cataract forming. No retinal pathology identified on B scan. BCL is likely gone as view to cornea and AC was clear but patient did not tolerate an exam long enough to confirm if the BCL is or is not in place. We discussed that he will need future surgeries to remove the cataract and that once the view to the fundus improves we will be able to assess his retina for any traumatic injuries which may also require future surgery. The patient received IV levofloxacin in the OR and intravitreal vancomycin and ceftazidime. No signs of infection today.       Plan:  -Mtz shield over eye   -Avoid eye rubbing, straining or pressure on eye  -Prednisolone QID left eye   -Moxifloxacin QID left eye   -Atropine BID left eye     Follow up 12/11/24 in clinic     We discussed return precautions and modes of contacting our service for any changing symptoms or other questions/concerns.     Eliud Eirc MD  Resident Physician, PGY-3  Department of Ophthalmology     HISTORY OF PRESENTING ILLNESS:     Guilherme Silverio is a 34 year old male who presented on 12/7/2024 after transfer from outside hospital. Around 7:30 pm on 12/7/2024 the patient smashed a drinking glass and some of went into his eye. Went to outside ED in [facility] where a CT was performed, this did not show a foreign body or ruptured globe. He was sent for further evaluation. Pain in the left eye - family said he has never had problems in the past.    12/08/2024 POD1 from left eye open globe repair. Mild pain, kept the patch on.     10+ review of systems were otherwise negative except for that which has been stated above.    OCULAR/MEDICAL/SURGICAL HISTORIES:     Past Ocular History:   Previously diagnosed ocular conditions: None  Prior eye  surgery/laser: Open globe repair left eye 12/8/24   Contact lens wear: None  Glasses: None  Eyedrops: None    Pertinent Systemic Medications:   None pertinent    Allergies:  Mom denies that patient has any allergy to antibiotics, latex, or anesthesia     Past Medical History:  Past Medical History:   Diagnosis Date    Acne     Autism     Constipation     Diarrhea     Rectal prolapse            EXAMINATION:     Base Eye Exam       Visual Acuity (Snellen - Linear)         Right Left    Near sc  Unable to accurately assess VA. Patient can identify hand movement, VA likely better              Tonometry (Palpation, 1:03 PM)         Right Left    Pressure  Soft but formed                  Slit Lamp and Fundus Exam       External Exam         Right Left    External Normal Normal              Slit Lamp Exam         Right Left    Lids/Lashes  Normal    Conjunctiva/Sclera  1+ injection    Cornea  10 9-0 sutures in place, edema, no leak, no iris in wound    Anterior Chamber  Shallow but formed, at least 2+ cell    Iris  Round    Lens  Early cataract    Anterior Vitreous  no view                    Labs/Studies/Imaging Performed  B scan: 12/8/24 No large vitreous debris or retinal detachment. No IOFB identified      Eliud Eric MD  Resident Physician, PGY-3  Department of Ophthalmology

## 2024-12-09 ENCOUNTER — TELEPHONE (OUTPATIENT)
Dept: OPHTHALMOLOGY | Facility: CLINIC | Age: 34
End: 2024-12-09
Payer: COMMERCIAL

## 2024-12-09 NOTE — TELEPHONE ENCOUNTER
Can you please schedule this patient to see Dr. Chinchilla at 7:30 am this Wednesday 12/11/24? It is for a post operative visit.     They will need a vision and gentle B scan of the left eye     --    Above per on call eye provider after ruptured globe repair over weekend.      Patient scheduled at 0730 on Wednesday at [facility] location with Dr. Chinchilla.    Note to patient communicator to review details of appt/confirm scheduling.    Wicho Castillo RN 7:25 AM 12/09/24

## 2024-12-11 ENCOUNTER — OFFICE VISIT (OUTPATIENT)
Dept: OPHTHALMOLOGY | Facility: CLINIC | Age: 34
End: 2024-12-11
Payer: COMMERCIAL

## 2024-12-11 ENCOUNTER — OFFICE VISIT (OUTPATIENT)
Dept: OPHTHALMOLOGY | Facility: CLINIC | Age: 34
End: 2024-12-11

## 2024-12-11 ENCOUNTER — TELEPHONE (OUTPATIENT)
Dept: OPHTHALMOLOGY | Facility: CLINIC | Age: 34
End: 2024-12-11
Payer: COMMERCIAL

## 2024-12-11 DIAGNOSIS — H26.132 TOTAL TRAUMATIC CATARACT OF LEFT EYE: Primary | ICD-10-CM

## 2024-12-11 PROCEDURE — 76519 ECHO EXAM OF EYE: CPT | Mod: RT | Performed by: OPHTHALMOLOGY

## 2024-12-11 ASSESSMENT — TONOMETRY
OS_IOP_MMHG: 10
IOP_METHOD: PALPATION

## 2024-12-11 ASSESSMENT — SLIT LAMP EXAM - LIDS
COMMENTS: NORMAL
COMMENTS: NORMAL

## 2024-12-11 ASSESSMENT — VISUAL ACUITY
METHOD: SNELLEN - LINEAR
OS_SC: 20/250

## 2024-12-11 ASSESSMENT — EXTERNAL EXAM - LEFT EYE: OS_EXAM: NORMAL

## 2024-12-11 ASSESSMENT — EXTERNAL EXAM - RIGHT EYE: OD_EXAM: NORMAL

## 2024-12-11 ASSESSMENT — CUP TO DISC RATIO: OD_RATIO: 0.2

## 2024-12-11 NOTE — TELEPHONE ENCOUNTER
Called patient to schedule surgery with Dr Guy    Spoke with: Laurie    Date(s) of Surgery: 12/12/24    Patient aware of approximate arrival time: Yes      Location of surgery: [facility]     Pre-Op H&P:  Already completed in the ED and updated in clinic today     Informed patient that they need to call to schedule pre-op H&P within 30 days of surgery date: Yes      Post-Op Appt Dates: 12/13 at 1100       Discussed with patient pre-op RN will call 2-3 days prior to surgery with arrival time and instructions:  Yes       Standard Surgery Packet Sent: Yes 12/11/24  via [employer] Message      Additional Information Sent in Packet:  na       Informed patient that they will need an adult  to bring patient home from surgery: Yes  : Laurie         Additional Comments:  NA      All patients questions were answered and was instructed to review surgical packet and call back [phone] with any questions or concerns.       Harika Brandon on 12/11/2024 at 12:25 PM

## 2024-12-11 NOTE — PROGRESS NOTES
Pre-Operative H & P     CC:  Preoperative exam to assess for increased cardiopulmonary risk while undergoing surgery and anesthesia.    Date of Encounter: 12/11/2024  Primary Care Physician:  Solo Kern     Reason for visit: No diagnosis found.    HPI  Guilherme Silverio is a 34 year old male who presents for pre-operative H & P in preparation for cataract surgery with Dr. Gyu at next available OR slot at [facility].     History is obtained from the patient's parent and guardian and chart review      Hx of abnormal bleeding or anti-platelet use: None    Menstrual history: No LMP for male patient.: Male patient    Prior to Admission Medications  Current Outpatient Medications   Medication Sig Dispense Refill    atropine 1 % ophthalmic solution Place 1 drop Into the left eye 2 times daily. 5 mL 0    clindamycin (CLEOCIN T) 1 % external solution Apply 1 mL topically daily.      diazepam (VALIUM) 5 MG tablet Take 1 tablet by mouth every 6 hours as needed for anxiety. 30 tablet 0    fluvoxaMINE (LUVOX) 50 MG tablet Take 50 mg by mouth at bedtime.      gabapentin (NEURONTIN) 300 MG capsule Take 2 in the AM, then 2 in the PM daily. 90 capsule 3    melatonin 3 MG tablet Take 1 tablet by mouth daily. At bedtime      moxifloxacin (VIGAMOX) 0.5 % ophthalmic solution Place 1 drop Into the left eye 4 times daily. 3 mL 0    ondansetron (ZOFRAN ODT) 4 MG ODT tab Take 1 tablet (4 mg) by mouth every 8 hours as needed for nausea. 12 tablet 0    prednisoLONE acetate (PRED FORTE) 1 % ophthalmic suspension Place 1 drop Into the left eye 4 times daily. 5 mL 0    risperiDONE (RISPERDAL) 1 MG tablet Take 1 mg by mouth at bedtime. 1.5 mg at bedtime      risperiDONE (RISPERDAL) 2 MG tablet Take 2 mg by mouth every morning.      sertraline (ZOLOFT) 50 MG tablet 1/2 idaily for 5 days; then 1 daily for 5 days; then 1 1/2 daily for 5 days; then 2 daily for 5 days; then 2 1/2 daily 90 tablet 3    tretinoin  (RETIN-A) 0.01 % gel Apply 1 dose topically At Bedtime.         Family History  No family history on file.    The complete review of systems is negative other than noted in the HPI or here.     Preprocedure Note            No Anesthesia note exists          Vitals:  BP - 112/77  HR - 96  SpO2 - 96%  Temp - 97.7     Physical Exam  Constitutional: Awake, alert, cooperative, no apparent distress, and appears stated age.  Eyes: traumatic cataract left eye.  HENT: Normocephalic, oral pharynx with moist mucus membranes, good dentition. No goiter appreciated.   Respiratory: Clear to auscultation bilaterally, no crackles or wheezing.  Cardiovascular: Regular rate and rhythm, normal S1 and S2, and no murmur noted.  GI: Normal bowel sounds, soft, non-distended, non-tender, no masses palpated, no hepatosplenomegaly.    Lymph/Hematologic: No cervical lymphadenopathy and no supraclavicular lymphadenopathy.  Skin: Warm and dry.  No rashes at anticipated surgical site.   Musculoskeletal: Full ROM of neck. There is no redness, warmth, or swelling of the joints. Gross motor strength is normal.    Neurologic: Awake, alert, oriented to name, place and time. Cranial nerves II-XII are grossly intact. Gait is normal.   Neuropsychiatric: Calm, cooperative. Normal affect.     PRIOR LABS/DIAGNOSTIC STUDIES:   All labs and imaging personally reviewed     EKG/ stress test - if available please see in ROS above   No results found.       No data to display              The patient's records and results personally reviewed by this provider.     Outside records reviewed from: care everywhere    LAB/DIAGNOSTIC STUDIES TODAY:  None    ASSESSMENT and PLAN    Plan/Recommendations  Pt will be optimized for the proposed procedure.  See below for details on the assessment, risk, and preoperative recommendations     Eye:  - Past ocular history: Trauma left eye s/p open globe repair    NEUROLOGY  - No h/o CVA, TIA  - Post Op delirium risk factors:  Age      ENT  - No current airway concerns.  Will need to be reassessed day of surgery.  Mallampati: Anesthesia will assess  TM: Anesthesia will assess     CARDIAC  - No h/o CAD/NSTEMI, CHF  - Does not have HLD and has not been on low-dose statin by choice  Patient performs 10 or more METS       0= 0.4%  1= 0.9%  2= 6.6%  3+ 11%  Revised Cardiac Risk Index: 0.4% risk of major adverse cardiac event.  RCRI-Minimal risk: Class 0  3.9% complication rate            PULMONARY  Snore - occasional   Male    1-2 Low  3-4 Intermediate  5-8 High  If =/> 3 and HCO3 =/> 28, treat as high risk  JANICE risk: Minimal Risk     Total Score: 2     - Denies asthma or inhaler use  - Tobacco History    Social History     Socioeconomic History    Marital status: Single   Tobacco Use    Smoking status: Never   Substance and Sexual Activity    Alcohol use: No    Drug use: No     Social Drivers of Health     Financial Resource Strain: Not At Risk (5/1/2024)    Received from [facility]    Financial Resource Strain     Is it hard for you to pay for the very basics like food, housing, medical care or heating?: No   Food Insecurity: Not At Risk (5/1/2024)    Received from [facility]    Food Insecurity     Does your food run out before you have the money to buy more?: No   Transportation Needs: Not At Risk (5/1/2024)    Received from [facility]    Transportation Needs     Does a lack of transportation keep you from your medical appointments or from getting your medications?: No   Interpersonal Safety: Unknown (12/7/2024)    Interpersonal Safety     Do you feel physically and emotionally safe where you currently live?: Patient unable to answer     Within the past 12 months, have you been hit, slapped, kicked or otherwise physically hurt by someone?: Patient unable to answer     Within the past 12 months, have you been humiliated or emotionally abused in other ways by your partner or ex-partner?: Patient unable to answer           GI  Some  heartburn  PONV risk score= 1.  (If > 2, anti-emetic intervention is recommended.)  PONV Medium Risk  Total Score: 2    1 AN PONV: Patient is not a current smoker       /RENAL  - No CKD     ENDOCRINE    There is no height or weight on file to calculate BMI.  - No history of Diabetes Mellitus or thyroid abnormalities     HEME  - No history of abnormal bleeding or antiplatelet use or anticoagulant use.    Male  2 points      0= 0.26%  1-2= 0.5%  3-5= 1.8%  6-8= 3%  >8= 4.5%  VTE risk: 0.5%       MSK  - None    My privilege to be part of your care,  Franklyn Dale MD  PGY-3 Ophthalmology Resident  [facility]    Attending Physician Attestation:  Complete documentation of historical and exam elements from today's encounter can be found in the full encounter summary report (not reduplicated in this progress note).  I personally obtained the chief complaint(s) and history of present illness.  I confirmed and edited as necessary the review of systems, past medical/surgical history, family history, social history, and examination findings as documented by others; and I examined the patient myself.  I personally reviewed the relevant tests, images, and reports as documented above.  I formulated and edited as necessary the assessment and plan and discussed the findings and management plan with the patient and family. - Mary Guy MD

## 2024-12-11 NOTE — PROGRESS NOTES
Chief complaint   Traumatic cataract    HPI    Guilherme Silverio 34 year old male       Interval hx 12/12/2024  Chief Complaint(s) and History of Present Illness(es)       Cataract Evaluation     Additional comments: Traumatic cataract 1 week post trauma referred for surgery                     Past ocular history   Prior eye surgery/laser/Trauma: -  CTL wearer:No  Glasses : *  Family Hx of eye disease: -    PMH     Past Medical History:   Diagnosis Date    Acne     Autism     Constipation     Diarrhea     Rectal prolapse        PSH     Past Surgical History:   Procedure Laterality Date    DENTAL SURGERY      HC TOOTH EXTRACTION W/FORCEP      NO HISTORY OF SURGERY      PE TUBES      REPAIR RUPTURED GLOBE Left 12/7/2024    Procedure: LEFT RUPTURED GLOBE REPAIR, IRIS REPOSITIONING;  Surgeon: Yves Huff MD;  Location:  OR    [facility] LAP,INGUINAL HERNIA REPR,RECUR         Meds     Current Outpatient Medications   Medication Sig Dispense Refill    atropine 1 % ophthalmic solution Place 1 drop Into the left eye 2 times daily. 5 mL 0    clindamycin (CLEOCIN T) 1 % external solution Apply 1 mL topically daily.      diazepam (VALIUM) 5 MG tablet Take 1 tablet by mouth every 6 hours as needed for anxiety. 30 tablet 0    fluvoxaMINE (LUVOX) 50 MG tablet Take 50 mg by mouth at bedtime.      gabapentin (NEURONTIN) 300 MG capsule Take 2 in the AM, then 2 in the PM daily. 90 capsule 3    melatonin 3 MG tablet Take 1 tablet by mouth daily. At bedtime      moxifloxacin (VIGAMOX) 0.5 % ophthalmic solution Place 1 drop Into the left eye 4 times daily. 3 mL 0    ondansetron (ZOFRAN ODT) 4 MG ODT tab Take 1 tablet (4 mg) by mouth every 8 hours as needed for nausea. 12 tablet 0    prednisoLONE acetate (PRED FORTE) 1 % ophthalmic suspension Place 1 drop Into the left eye 4 times daily. 5 mL 0    risperiDONE (RISPERDAL) 1 MG tablet Take 1 mg by mouth at bedtime. 1.5 mg at bedtime      risperiDONE (RISPERDAL) 2 MG tablet Take 2  mg by mouth every morning.      sertraline (ZOLOFT) 50 MG tablet 1/2 idaily for 5 days; then 1 daily for 5 days; then 1 1/2 daily for 5 days; then 2 daily for 5 days; then 2 1/2 daily 90 tablet 3    tretinoin (RETIN-A) 0.01 % gel Apply 1 dose topically At Bedtime.       No current facility-administered medications for this visit.       Labs   -    Imaging   -    Drops Currently Taking   -    Assessment/Plan 12/11/2024   # traumatic cataract OS  #traumatic globe rupture subsequent encounters    Patient is having difficulty with daily activities due to visual impairment. Lens is ruptured and requred aspiration within few days.   Discussed r/B/A with patient. Told patient lens may dislocate in the back of the eye and may require additional surgeries. Told that measurements are inaccurate due to corneal irregularities.         -Aim: distance    Plan  Proceed with surgery  Continue moxi pred and atropine         Attending Physician Attestation:  Complete documentation of historical and exam elements from today's encounter can be found in the full encounter summary report (not reduplicated in this progress note).  I personally obtained the chief complaint(s) and history of present illness.  I confirmed and edited as necessary the review of systems, past medical/surgical history, family history, social history, and examination findings as documented by others; and I examined the patient myself.  I personally reviewed the relevant tests, images, and reports as documented above.  I formulated and edited as necessary the assessment and plan and discussed the findings and management plan with the patient and family. - Mary Guy MD

## 2024-12-12 ENCOUNTER — ANESTHESIA (OUTPATIENT)
Dept: SURGERY | Facility: AMBULATORY SURGERY CENTER | Age: 34
End: 2024-12-12
Payer: COMMERCIAL

## 2024-12-12 ENCOUNTER — ANESTHESIA EVENT (OUTPATIENT)
Dept: SURGERY | Facility: AMBULATORY SURGERY CENTER | Age: 34
End: 2024-12-12
Payer: COMMERCIAL

## 2024-12-12 ENCOUNTER — HOSPITAL ENCOUNTER (OUTPATIENT)
Facility: AMBULATORY SURGERY CENTER | Age: 34
End: 2024-12-12
Attending: OPHTHALMOLOGY
Payer: COMMERCIAL

## 2024-12-12 VITALS
OXYGEN SATURATION: 97 % | BODY MASS INDEX: 26.68 KG/M2 | WEIGHT: 197 LBS | TEMPERATURE: 97.5 F | RESPIRATION RATE: 17 BRPM | DIASTOLIC BLOOD PRESSURE: 66 MMHG | HEART RATE: 55 BPM | SYSTOLIC BLOOD PRESSURE: 97 MMHG | HEIGHT: 72 IN

## 2024-12-12 DIAGNOSIS — H26.132 TOTAL TRAUMATIC CATARACT OF LEFT EYE: Primary | ICD-10-CM

## 2024-12-12 RX ORDER — SCOLOPAMINE TRANSDERMAL SYSTEM 1 MG/1
1 PATCH, EXTENDED RELEASE TRANSDERMAL ONCE
Status: ACTIVE | OUTPATIENT
Start: 2024-12-12

## 2024-12-12 RX ORDER — LIDOCAINE HYDROCHLORIDE 20 MG/ML
INJECTION, SOLUTION INFILTRATION; PERINEURAL PRN
Status: DISCONTINUED | OUTPATIENT
Start: 2024-12-12 | End: 2024-12-12

## 2024-12-12 RX ORDER — BALANCED SALT SOLUTION 6.4; .75; .48; .3; 3.9; 1.7 MG/ML; MG/ML; MG/ML; MG/ML; MG/ML; MG/ML
SOLUTION OPHTHALMIC PRN
Status: DISCONTINUED | OUTPATIENT
Start: 2024-12-12 | End: 2024-12-12 | Stop reason: HOSPADM

## 2024-12-12 RX ORDER — CYCLOPENTOLAT/TROPIC/PHENYLEPH 1%-1%-2.5%
1 DROPS (EA) OPHTHALMIC (EYE)
Status: COMPLETED | OUTPATIENT
Start: 2024-12-12 | End: 2024-12-12

## 2024-12-12 RX ORDER — FENTANYL CITRATE 50 UG/ML
50 INJECTION, SOLUTION INTRAMUSCULAR; INTRAVENOUS EVERY 5 MIN PRN
Status: ACTIVE | OUTPATIENT
Start: 2024-12-12

## 2024-12-12 RX ORDER — NALOXONE HYDROCHLORIDE 0.4 MG/ML
0.1 INJECTION, SOLUTION INTRAMUSCULAR; INTRAVENOUS; SUBCUTANEOUS
Status: ACTIVE | OUTPATIENT
Start: 2024-12-12

## 2024-12-12 RX ORDER — PROPARACAINE HYDROCHLORIDE 5 MG/ML
1 SOLUTION/ DROPS OPHTHALMIC ONCE
Status: COMPLETED | OUTPATIENT
Start: 2024-12-12 | End: 2024-12-12

## 2024-12-12 RX ORDER — DEXAMETHASONE SODIUM PHOSPHATE 10 MG/ML
4 INJECTION, SOLUTION INTRAMUSCULAR; INTRAVENOUS
Status: ACTIVE | OUTPATIENT
Start: 2024-12-12

## 2024-12-12 RX ORDER — FENTANYL CITRATE 50 UG/ML
INJECTION, SOLUTION INTRAMUSCULAR; INTRAVENOUS PRN
Status: DISCONTINUED | OUTPATIENT
Start: 2024-12-12 | End: 2024-12-12

## 2024-12-12 RX ORDER — ONDANSETRON 2 MG/ML
4 INJECTION INTRAMUSCULAR; INTRAVENOUS EVERY 30 MIN PRN
Status: ACTIVE | OUTPATIENT
Start: 2024-12-12

## 2024-12-12 RX ORDER — OXYCODONE HYDROCHLORIDE 5 MG/1
5 TABLET ORAL
Status: ACTIVE | OUTPATIENT
Start: 2024-12-12

## 2024-12-12 RX ORDER — ONDANSETRON 2 MG/ML
INJECTION INTRAMUSCULAR; INTRAVENOUS PRN
Status: DISCONTINUED | OUTPATIENT
Start: 2024-12-12 | End: 2024-12-12

## 2024-12-12 RX ORDER — MOXIFLOXACIN IN NACL,ISO-OS/PF 0.3MG/0.3
SYRINGE (ML) INTRAOCULAR PRN
Status: DISCONTINUED | OUTPATIENT
Start: 2024-12-12 | End: 2024-12-12 | Stop reason: HOSPADM

## 2024-12-12 RX ORDER — TETRACAINE HYDROCHLORIDE 5 MG/ML
SOLUTION OPHTHALMIC PRN
Status: DISCONTINUED | OUTPATIENT
Start: 2024-12-12 | End: 2024-12-12 | Stop reason: HOSPADM

## 2024-12-12 RX ORDER — PROPOFOL 10 MG/ML
INJECTION, EMULSION INTRAVENOUS PRN
Status: DISCONTINUED | OUTPATIENT
Start: 2024-12-12 | End: 2024-12-12

## 2024-12-12 RX ORDER — DEXAMETHASONE SODIUM PHOSPHATE 4 MG/ML
INJECTION, SOLUTION INTRA-ARTICULAR; INTRALESIONAL; INTRAMUSCULAR; INTRAVENOUS; SOFT TISSUE PRN
Status: DISCONTINUED | OUTPATIENT
Start: 2024-12-12 | End: 2024-12-12

## 2024-12-12 RX ORDER — TRIAMCINOLONE ACETONIDE 40 MG/ML
INJECTION, SUSPENSION INTRA-ARTICULAR; INTRAMUSCULAR PRN
Status: DISCONTINUED | OUTPATIENT
Start: 2024-12-12 | End: 2024-12-12 | Stop reason: HOSPADM

## 2024-12-12 RX ORDER — ONDANSETRON 4 MG/1
4 TABLET, ORALLY DISINTEGRATING ORAL EVERY 30 MIN PRN
Status: ACTIVE | OUTPATIENT
Start: 2024-12-12

## 2024-12-12 RX ORDER — OXYCODONE HYDROCHLORIDE 5 MG/1
10 TABLET ORAL
Status: ACTIVE | OUTPATIENT
Start: 2024-12-12

## 2024-12-12 RX ORDER — SODIUM CHLORIDE, SODIUM LACTATE, POTASSIUM CHLORIDE, CALCIUM CHLORIDE 600; 310; 30; 20 MG/100ML; MG/100ML; MG/100ML; MG/100ML
INJECTION, SOLUTION INTRAVENOUS CONTINUOUS PRN
Status: DISCONTINUED | OUTPATIENT
Start: 2024-12-12 | End: 2024-12-12

## 2024-12-12 RX ORDER — FENTANYL CITRATE 50 UG/ML
25 INJECTION, SOLUTION INTRAMUSCULAR; INTRAVENOUS EVERY 5 MIN PRN
Status: ACTIVE | OUTPATIENT
Start: 2024-12-12

## 2024-12-12 RX ORDER — GLYCOPYRROLATE 0.2 MG/ML
INJECTION, SOLUTION INTRAMUSCULAR; INTRAVENOUS PRN
Status: DISCONTINUED | OUTPATIENT
Start: 2024-12-12 | End: 2024-12-12

## 2024-12-12 RX ORDER — LIDOCAINE HYDROCHLORIDE 10 MG/ML
INJECTION, SOLUTION EPIDURAL; INFILTRATION; INTRACAUDAL; PERINEURAL PRN
Status: DISCONTINUED | OUTPATIENT
Start: 2024-12-12 | End: 2024-12-12 | Stop reason: HOSPADM

## 2024-12-12 RX ORDER — HYDROMORPHONE HYDROCHLORIDE 1 MG/ML
0.4 INJECTION, SOLUTION INTRAMUSCULAR; INTRAVENOUS; SUBCUTANEOUS EVERY 5 MIN PRN
Status: ACTIVE | OUTPATIENT
Start: 2024-12-12

## 2024-12-12 RX ORDER — MOXIFLOXACIN 5 MG/ML
1 SOLUTION/ DROPS OPHTHALMIC
Status: COMPLETED | OUTPATIENT
Start: 2024-12-12 | End: 2024-12-12

## 2024-12-12 RX ORDER — PROPOFOL 10 MG/ML
INJECTION, EMULSION INTRAVENOUS CONTINUOUS PRN
Status: DISCONTINUED | OUTPATIENT
Start: 2024-12-12 | End: 2024-12-12

## 2024-12-12 RX ORDER — SODIUM CHLORIDE, SODIUM LACTATE, POTASSIUM CHLORIDE, CALCIUM CHLORIDE 600; 310; 30; 20 MG/100ML; MG/100ML; MG/100ML; MG/100ML
INJECTION, SOLUTION INTRAVENOUS CONTINUOUS
Status: ACTIVE | OUTPATIENT
Start: 2024-12-12

## 2024-12-12 RX ORDER — HYDROMORPHONE HYDROCHLORIDE 1 MG/ML
0.2 INJECTION, SOLUTION INTRAMUSCULAR; INTRAVENOUS; SUBCUTANEOUS EVERY 5 MIN PRN
Status: ACTIVE | OUTPATIENT
Start: 2024-12-12

## 2024-12-12 RX ORDER — FENTANYL CITRATE 50 UG/ML
25 INJECTION, SOLUTION INTRAMUSCULAR; INTRAVENOUS
Status: ACTIVE | OUTPATIENT
Start: 2024-12-12

## 2024-12-12 RX ADMIN — Medication 1 DROP: at 12:25

## 2024-12-12 RX ADMIN — SODIUM CHLORIDE, SODIUM LACTATE, POTASSIUM CHLORIDE, CALCIUM CHLORIDE: 600; 310; 30; 20 INJECTION, SOLUTION INTRAVENOUS at 14:23

## 2024-12-12 RX ADMIN — SCOLOPAMINE TRANSDERMAL SYSTEM 1 PATCH: 1 PATCH, EXTENDED RELEASE TRANSDERMAL at 13:23

## 2024-12-12 RX ADMIN — Medication 1 DROP: at 12:30

## 2024-12-12 RX ADMIN — MOXIFLOXACIN 1 DROP: 5 SOLUTION/ DROPS OPHTHALMIC at 12:25

## 2024-12-12 RX ADMIN — LIDOCAINE HYDROCHLORIDE 80 MG: 20 INJECTION, SOLUTION INFILTRATION; PERINEURAL at 14:27

## 2024-12-12 RX ADMIN — PROPOFOL 200 MCG/KG/MIN: 10 INJECTION, EMULSION INTRAVENOUS at 14:28

## 2024-12-12 RX ADMIN — PROPOFOL 170 MG: 10 INJECTION, EMULSION INTRAVENOUS at 14:28

## 2024-12-12 RX ADMIN — Medication 1 DROP: at 12:15

## 2024-12-12 RX ADMIN — DEXAMETHASONE SODIUM PHOSPHATE 4 MG: 4 INJECTION, SOLUTION INTRA-ARTICULAR; INTRALESIONAL; INTRAMUSCULAR; INTRAVENOUS; SOFT TISSUE at 14:28

## 2024-12-12 RX ADMIN — GLYCOPYRROLATE 0.2 MG: 0.2 INJECTION, SOLUTION INTRAMUSCULAR; INTRAVENOUS at 14:40

## 2024-12-12 RX ADMIN — FENTANYL CITRATE 100 MCG: 50 INJECTION, SOLUTION INTRAMUSCULAR; INTRAVENOUS at 14:28

## 2024-12-12 RX ADMIN — ONDANSETRON 4 MG: 2 INJECTION INTRAMUSCULAR; INTRAVENOUS at 14:28

## 2024-12-12 RX ADMIN — MOXIFLOXACIN 1 DROP: 5 SOLUTION/ DROPS OPHTHALMIC at 12:15

## 2024-12-12 RX ADMIN — PROPARACAINE HYDROCHLORIDE 1 DROP: 5 SOLUTION/ DROPS OPHTHALMIC at 12:15

## 2024-12-12 RX ADMIN — MOXIFLOXACIN 1 DROP: 5 SOLUTION/ DROPS OPHTHALMIC at 12:30

## 2024-12-12 NOTE — ANESTHESIA PREPROCEDURE EVALUATION
Anesthesia Pre-Procedure Evaluation    Patient: Guilherme Silverio   MRN: [id number] : 1990        Procedure : Procedure(s):  PHACOEMULSIFICATION, CATARACT, WITH STANDARD INTRAOCULAR LENS IMPLANT INSERTION (Left: Eye)       Past Medical History:   Diagnosis Date    Acne     Autism     Constipation     Diarrhea     Rectal prolapse       Past Surgical History:   Procedure Laterality Date    DENTAL SURGERY      HC TOOTH EXTRACTION W/FORCEP      NO HISTORY OF SURGERY      PE TUBES      REPAIR RUPTURED GLOBE Left 2024    Procedure: LEFT RUPTURED GLOBE REPAIR, IRIS REPOSITIONING;  Surgeon: Yves Huff MD;  Location:  OR    [facility] LAP,INGUINAL HERNIA REPR,RECUR        No Known Allergies   Social History     Tobacco Use    Smoking status: Never    Smokeless tobacco: Not on file   Substance Use Topics    Alcohol use: No      Wt Readings from Last 1 Encounters:   24 89.4 kg (197 lb)        Anesthesia Evaluation   Pt has had prior anesthetic. Type: General.    History of anesthetic complications  - PONV.      ROS/MED HX  ENT/Pulmonary:  - neg pulmonary ROS     Neurologic: Comment: Autism    (+)                         Developmental delay,       Cardiovascular:  - neg cardiovascular ROS     METS/Exercise Tolerance: 3 - Able to walk 1-2 blocks without stopping    Hematologic:  - neg hematologic  ROS     Musculoskeletal:  - neg musculoskeletal ROS     GI/Hepatic: Comment: Occasional vomiting - neg GI/hepatic ROS     Renal/Genitourinary:  - neg Renal ROS     Endo:  - neg endo ROS     Psychiatric/Substance Use: Comment: OCD      Infectious Disease:  - neg infectious disease ROS     Malignancy:  - neg malignancy ROS     Other:            Physical Exam    Airway   unable to assess   Comment: +overbite         Respiratory Devices and Support         Dental       (+) Multiple visibly decayed, broken teeth      Cardiovascular   cardiovascular exam normal          Pulmonary   pulmonary exam normal       "        OUTSIDE LABS:  CBC: No results found for: \"WBC\", \"HGB\", \"HCT\", \"PLT\"  BMP: No results found for: \"NA\", \"POTASSIUM\", \"CHLORIDE\", \"CO2\", \"BUN\", \"CR\", \"GLC\"  COAGS: No results found for: \"PTT\", \"INR\", \"FIBR\"  POC: No results found for: \"BGM\", \"HCG\", \"HCGS\"  HEPATIC: No results found for: \"ALBUMIN\", \"PROTTOTAL\", \"ALT\", \"AST\", \"GGT\", \"ALKPHOS\", \"BILITOTAL\", \"BILIDIRECT\", \"NICKI\"  OTHER: No results found for: \"PH\", \"LACT\", \"A1C\", \"ORION\", \"PHOS\", \"MAG\", \"LIPASE\", \"AMYLASE\", \"TSH\", \"T4\", \"T3\", \"CRP\", \"SED\"    Anesthesia Plan    ASA Status:  2, emergent    NPO Status:  NPO Appropriate    Anesthesia Type: General.     - Airway: LMA   Induction: Intravenous, Propofol.   Maintenance: Balanced.        Consents    Anesthesia Plan(s) and associated risks, benefits, and realistic alternatives discussed. Questions answered and patient/representative(s) expressed understanding.     - Discussed: Risks, Benefits and Alternatives for the PROCEDURE were discussed     - Discussed with:  Patient            Postoperative Care    Pain management: Oral pain medications, IV analgesics, Multi-modal analgesia.   PONV prophylaxis: Ondansetron (or other 5HT-3), Dexamethasone or Solumedrol, Background Propofol Infusion, Scopolamine patch     Comments:               Savi Patel MD    I have reviewed the pertinent notes and labs in the chart from the past 30 days and (re)examined the patient.  Any updates or changes from those notes are reflected in this note.                         # Overweight: Estimated body mass index is 26.72 kg/m  as calculated from the following:    Height as of this encounter: 1.829 m (6').    Weight as of this encounter: 89.4 kg (197 lb).             "

## 2024-12-12 NOTE — OP NOTE
Operative  Report    Patient Name: Guilherme Silverio    MRN: [id number]    Date of Surgery: 12/12/2024    Surgeon: Mary Guy M.D    Assistant surgeon: Franky Acuña MD    Preoperative Diagnosis: Traumatic cataract, left eye    Postoperative Diagnosis: Same  Traumatic cataract, left eye  Leaking central corneal wound, left eye     Procedure:     Phacoemulsification with intraocular lens implantation, left eye  Revision/ Suturing of leaking central corneal wound, left eye     Implant: AIM Distance  Implant Name Type Inv. Item Serial No.  Lot No. LRB No. Used Action   LENS [id number] 22.0 CLARE[employer] UV ASPHERIC BICONVEX IOL - [id number] Lens/Eye Implant LENS [id number] 22.0 CLARE[employer] UV ASPHERIC BICONVEX IOL [id number] [employer]  Left 1 Implanted       Anesthesia Type: General    Estimated Blood Loss: Trace    Complications: None                                                       INDICATIONS FOR PROCEDURE: Patient has a history of visually significant cataract affecting the patient's activity of daily living. After a discussion with the patient, the patient has elected to have the listed procedure(s) to maximize visual potential. All of the appropriate consent forms have been signed and all of the patient's questions have been answered.    DETAILS OF THE PROCEDURE: Patient was identified in the pre operative area and given pre operative eye drops. The patient was then brought into the operating room and intravenous sedation was begun after a time out was performed. The patient was prepped and draped in the usual sterile ophthalmic fashion.     A lid speculum was placed and the operating microscope was brought into position. A paracentesis was made and lidocain and viscoelastic was injected into the anterior chamber. A clear corneal incision was made using a keratome blade.rhexis was open due to troma Using the phacoemulsification unit, the nucleus was then  removed from the eye. Using irrigation and aspiration, the remaining cortical material was removed from the eye. The capsular bag was infused with viscoelastic material. The intraocular lens was then placed into the capsular bag and secured into position. The remaining viscoelastic material was then removed from the eye via irrigation and aspiration.  BSS on a blunt tipped cannula was used to hydrate all of the wounds. A 10.0 nylon suture was used to seal the main corneal incision and another 10.0 nylon suture was used to seal the central corneal wound caused by the previous trauma as it was found to be leaking in the end of surgery..  At the end of the case, the wounds were noted to be watertight, the pupil was noted to be round, the lens appeared to be in good position, and the pressure was palpated to be physiologic. Intracameral moxifloxacin and a subconjunctival injection of Kenalog were administered.     Post operative ointment was applied and the eye was patched and shielded. Patient tolerated procedure and was brought to the recovery area in good condition. Patient will follow in the eye clinic in one day.

## 2024-12-12 NOTE — ANESTHESIA CARE TRANSFER NOTE
June 27, 2024    Tyrell Lares MD  [address] [facility] [place] [address]      Dear Dr. Lares    Patient: Dean Damon   MR Number: [id number]   YOB: 1941     Thank you for the referral of Dean Damon to the [facility] program. An initial appointment will be scheduled for your patient with one of our Hepatologists.      Thank you again for your trust in our program.  If there is anything we can do for you or your staff, please feel free to contact us.        Sincerely,        [facility]  [address] [place] [address]  [phone]       Patient: Guilherme Silverio    Procedure: Procedure(s):  PHACOEMULSIFICATION, CATARACT, WITH STANDARD INTRAOCULAR LENS IMPLANT INSERTION       Diagnosis: Total traumatic cataract of left eye [H26.132]  Diagnosis Additional Information: No value filed.    Anesthesia Type:   General     Note:    Oropharynx: oropharynx clear of all foreign objects and spontaneously breathing  Level of Consciousness: drowsy  Oxygen Supplementation: face mask  Level of Supplemental Oxygen (L/min / FiO2): 6  Independent Airway: airway patency satisfactory and stable  Dentition: dentition unchanged  Vital Signs Stable: post-procedure vital signs reviewed and stable  Report to RN Given: handoff report given  Patient transferred to: PACU  Comments: Uneventful transport   Report to MICHAEL Salgado  Mom present  Exchanging well; color natl  Pt responds appropriately to command  IV patent  Lips/teeth/dentition as preop status  Questions answered      Handoff Report: Identifed the Patient, Identified the Reponsible Provider, Reviewed the pertinent medical history, Discussed the surgical course, Reviewed Intra-OP anesthesia mangement and issues during anesthesia, Set expectations for post-procedure period and Allowed opportunity for questions and acknowledgement of understanding      Vitals:  Vitals Value Taken Time   /60 12/12/24 1515   Temp 36.2  C (97.2  F) 12/12/24 1512   Pulse 53 12/12/24 1517   Resp 13 12/12/24 1517   SpO2 98 % 12/12/24 1517   Vitals shown include unfiled device data.    Electronically Signed By: TYLOR GRAHAM CRNA  December 12, 2024  3:19 PM

## 2024-12-12 NOTE — ANESTHESIA POSTPROCEDURE EVALUATION
Patient: Guilherme Silverio    Procedure: Procedure(s):  PHACOEMULSIFICATION, CATARACT, WITH STANDARD INTRAOCULAR LENS IMPLANT INSERTION       Anesthesia Type:  General    Note:  Disposition: Outpatient   Postop Pain Control: Uneventful            Sign Out: Well controlled pain   PONV: No   Neuro/Psych: Uneventful            Sign Out: Acceptable/Baseline neuro status   Airway/Respiratory: Uneventful            Sign Out: Acceptable/Baseline resp. status   CV/Hemodynamics: Uneventful            Sign Out: Acceptable CV status; No obvious hypovolemia; No obvious fluid overload   Other NRE: NONE   DID A NON-ROUTINE EVENT OCCUR?            Last vitals:  Vitals Value Taken Time   /65 12/12/24 1530   Temp 36.2  C (97.2  F) 12/12/24 1512   Pulse 60 12/12/24 1530   Resp 12 12/12/24 1530   SpO2 97 % 12/12/24 1530   Vitals shown include unfiled device data.    Electronically Signed By: Ritesh Martinez MD  December 12, 2024  4:05 PM

## 2024-12-12 NOTE — DISCHARGE INSTRUCTIONS
Post-Operative Instructions     FIRST 24 HOURS AFTER SURGERY:  You are allowed to Tylenol (acetaminophen) 650mg every four hours as needed for pain unless you have liver disease or are allergic to Tylenol..  Continue taking your regular medications and eye drops in the non-operative eye.  Do not remove the metal or plastic shield unless otherwise instructed by your surgeon.  Do not operate a car, motorcycle, or machinery for 24 hours after surgery.  Call ED immediately if you have SEVERE PAIN unrelieved with Tylenol. And  ask for the resident on call.     MEDICATION INSTRUCTIONS:  -You will not have treatment eye drops prescription as medications were injected into your eye.  -If you feel your eyes are dry and itchy, you can use regular lubricating drops for one month after the surgery. These eye drops can be found over the counter Brand names example: Systane, Refresh. Please choose preservative free drops. To be used four times a day and as needed for 1 month  -Instilling the eye drops directly into the eye.    -If you had previously any glaucoma eye drops, You can remove the cover and instill the drops as prescribed before and after the procedure      GENERAL INSTRUCTIONS:  Hygiene of the operated eye  Wash your hands thoroughly before caring for the eye.  If the lids are sticky or itchy in the morning, debris, or matter can be gently wiped away with a cotton ball moistened with tap water. DO NOT press on the lids or eyeball.     FIVE MINUTES APART. If ointment is prescribed, it should be applied last.  If you have been taking medications in the non-operated eye, continue as they were prescribed.  If you take medications by mouth for a medical problem, continue as they were prescribed (unless otherwise instructed by physician)    EYE PROTECTION  From the time of surgery until the time of your post-operative day 1 appointment, wear the eye shield at all times. Then, wear it whenever sleeping, for 1  week.  Protective sunglasses may be worn as needed for your comfort, and are suggested for outdoor activities.    ACTIVITIES  Avoid vigorous exertion and heavy lifting for the first week after surgery  You may take a bath or shower, but avoid getting water directly into your eye for one week after surgery, usually by keeping your eyes closed during the bath.  You should discuss driving and traveling with your surgeon. You may ride in a car and fly in an airplane unless otherwise instructed.  Avoid swimming or using a hot tube/sauna/pool/lake for 2 weeks after the surgery.      WHAT TO EXPECT:  Mild irritation and discomfort are normal.    Call the doctor if you experience any of the following:  Severe eye pain  Nausea  Vomiting  Severe headache      [facility]  Home Care Following Anesthesia  For 24 hours after surgery:  Get plenty of rest.  A responsible adult must stay with you for at least 24 hours after you leave the surgery center.  Do not drive or use heavy equipment.  If you have weakness or tingling, don't drive or use heavy equipment until this feeling goes away.   Do not drink alcohol.   Avoid strenuous or risky activities.  Ask for help when climbing stairs.  You may feel lightheaded.  IF so, sit for a few minutes before standing.  Have someone help you get up.   If you have nausea (feel sick to your stomach): Drink only clear liquids such as apple juice, ginger ale, broth or 7-Up.  Rest may also help.  Be sure to drink enough fluids.  Move to a regular diet as you feel able.   You may have a slight fever.  Call the doctor if your fever is over 100 F (37.7 C) (taken under the tongue) or lasts longer than 24 hours.  You may have a dry mouth, a sore throat, muscle aches or trouble sleeping. These should go away after 24 hours.  Do not make important or legal decisions.   It is recommended to avoid smoking.         Tips for taking pain medications  To get the best pain  relief possible, remember these points:  Take pain medications as directed, before pain becomes severe.  Pain medication can upset your stomach: taking it with food may help.  Constipation is a common side effect of pain medication. Drink plenty of  fluids.  Eat foods high in fiber. Take a stool softener if recommended by your doctor or pharmacist.  Do not drink alcohol, drive or operate machinery while taking pain medications.  Ask about other ways to control pain, such as with heat, ice or relaxation.    Tylenol/Acetaminophen Consumption    If you feel your pain relief is insufficient, you may take Tylenol/Acetaminophen in addition to your narcotic pain medication.   Be careful not to exceed 4,000 mg of Tylenol/Acetaminophen in a 24 hour period from all sources.  If you are taking extra strength Tylenol/acetaminophen (500 mg), the maximum dose is 8 tablets in 24 hours.  If you are taking regular strength acetaminophen (325 mg), the maximum dose is 12 tablets in 24 hours.    Call a doctor for any of the following:  Signs of infection (fever, growing tenderness at the surgery site, a large amount of drainage or bleeding, severe pain, foul-smelling drainage, redness, swelling).  It has been over 8 to 10 hours since surgery and you are still not able to urinate (pass water).  Headache for over 24 hours.  Numbness, tingling or weakness the day after surgery (if you had spinal anesthesia).  Signs of Covid-19 infection (temperature over 100 degrees, shortness of breath, cough, loss of taste/smell, generalized body aches, persistent headache, chills, sore throat, nausea/vomiting/diarrhea)  Your doctor is:  Dr. Mary Guy, Ophthalmology: [phone]                  Or dial [phone] and ask for the resident on call for:  Ophthalmology  For emergency care, call the:  [facility]:  [phone] (TTY for hearing impaired: [phone])

## 2024-12-12 NOTE — ANESTHESIA PROCEDURE NOTES
Airway       Patient location during procedure: OR  Staff -        CRNA: Macrina Torres APRN CRNA       Other Anesthesia Staff: Tiffanie Ray       Performed By: SRNAIndications and Patient Condition       Indications for airway management: juan-procedural       Induction type:intravenous       Mask difficulty assessment: 0 - not attempted    Final Airway Details       Final airway type: supraglottic airway    Supraglottic Airway Details        Type: LMA       Brand: LMA Unique       LMA size: 4    Post intubation assessment        Placement verified by: capnometry and equal breath sounds        Number of attempts at approach: 1       Secured with: tape       Ease of procedure: easy       Dentition: Intact and Unchanged

## 2024-12-22 DIAGNOSIS — S05.32XD RUPTURED GLOBE OF LEFT EYE, SUBSEQUENT ENCOUNTER: ICD-10-CM

## 2024-12-23 RX ORDER — MOXIFLOXACIN 5 MG/ML
1 SOLUTION/ DROPS OPHTHALMIC 4 TIMES DAILY
Qty: 3 ML | Refills: 0 | OUTPATIENT
Start: 2024-12-23

## 2025-02-07 PROBLEM — S05.32XD: Status: ACTIVE | Noted: 2025-02-07

## 2025-04-29 ENCOUNTER — TELEPHONE (OUTPATIENT)
Dept: OPHTHALMOLOGY | Facility: CLINIC | Age: 35
End: 2025-04-29
Payer: COMMERCIAL

## 2025-04-29 NOTE — TELEPHONE ENCOUNTER
[phone] Jessa Guy recommended moving up surgery to this Thursday.    Mother calling triage line to review if able to move surgery up.    Reviewed would forward to surgery team to update mother on surgery scheduling plan.    Wicho Castlilo RN 11:43 AM 04/29/25

## 2025-04-30 ENCOUNTER — PRE VISIT (OUTPATIENT)
Dept: SURGERY | Facility: CLINIC | Age: 35
End: 2025-04-30

## 2025-04-30 ENCOUNTER — VIRTUAL VISIT (OUTPATIENT)
Dept: SURGERY | Facility: CLINIC | Age: 35
End: 2025-04-30
Payer: COMMERCIAL

## 2025-04-30 ENCOUNTER — ANESTHESIA EVENT (OUTPATIENT)
Dept: SURGERY | Facility: AMBULATORY SURGERY CENTER | Age: 35
End: 2025-04-30
Payer: COMMERCIAL

## 2025-04-30 VITALS — BODY MASS INDEX: 24.38 KG/M2 | WEIGHT: 180 LBS | HEIGHT: 72 IN

## 2025-04-30 DIAGNOSIS — Z01.818 PREOP EXAMINATION: Primary | ICD-10-CM

## 2025-04-30 DIAGNOSIS — S05.32XD CORNEAL LACERATION OF LEFT EYE, SUBSEQUENT ENCOUNTER: ICD-10-CM

## 2025-04-30 RX ORDER — LORAZEPAM 1 MG/1
1-2 TABLET ORAL 2 TIMES DAILY PRN
COMMUNITY

## 2025-04-30 RX ORDER — CYCLOSPORINE 0.5 MG/ML
1 EMULSION OPHTHALMIC 2 TIMES DAILY
COMMUNITY

## 2025-04-30 ASSESSMENT — ENCOUNTER SYMPTOMS
SEIZURES: 1
SEIZURES: 1

## 2025-04-30 ASSESSMENT — LIFESTYLE VARIABLES
TOBACCO_USE: 0
TOBACCO_USE: 0

## 2025-04-30 ASSESSMENT — PAIN SCALES - GENERAL: PAINLEVEL_OUTOF10: MILD PAIN (2)

## 2025-04-30 NOTE — PROGRESS NOTES
Guilherme is a 34 year old who is being evaluated via a billable video visit.    How would you like to obtain your AVS? MyChart  If the video visit is dropped, the invitation should be resent by: Text to cell phone: [phone]      Subjective   Guilherme is a 34 year old, presenting for the following health issues:  Pre-Op Exam      HPI            Physical Exam

## 2025-04-30 NOTE — H&P
Pre-Operative H & P     CC:  Preoperative exam to assess for increased cardiopulmonary risk while undergoing surgery and anesthesia.    Date of Encounter: 4/30/2025  Primary Care Physician:  Solo Kern     Reason for visit:   Encounter Diagnoses   Name Primary?    Corneal laceration of left eye, subsequent encounter Yes    Preop examination        HPI  Guilherme Silverio is a 34 year old male who presents for pre-operative H & P in preparation for  Procedure Information       Case: [id number] Date/Time: 05/01/25 1200    Procedures:       LEFT EYE REMOVAL, SUTURE, EYE, POSTPROCEDURAL (Left: Eye)      BILATERAL EXAM UNDER ANESTHESIA (Bilateral: Eye)    Anesthesia type: General    Diagnosis: Corneal laceration of left eye, subsequent encounter [S05.32XD]    Pre-op diagnosis: Corneal laceration of left eye, subsequent encounter [S05.32XD]    Location: [place] [address] / [facility]    Providers: Mary Guy MD            Patient is being evaluated for comorbid conditions of autism, cognitive impairment, OCD, chronic constipation, h/o rectal prolapse.    Mr. Silverio sustained a left eye injury in December 2024. He underwent a ruptured globe repair and iris repositioning on 12/7, and phacoemulsification, cataract w/ intraocular lens implant on 12/12. He and his family have been counseled by Dr. Guy and he now presents for the above procedure.      History is obtained from the patient's mother, Laurie, and chart review. Patient has a cognitive impairment.    Hx of abnormal bleeding or anti-platelet use: denies      Past Medical History  Past Medical History:   Diagnosis Date    Acne     Autism     Constipation     Corneal laceration of left eye 12/2024    Development delay     Diarrhea     Rectal prolapse        Past Surgical History  Past Surgical History:   Procedure Laterality Date    DENTAL SURGERY      HC TOOTH EXTRACTION W/FORCEP      PE TUBES       PHACOEMULSIFICATION WITH STANDARD INTRAOCULAR LENS IMPLANT Left 12/12/2024    Procedure: PHACOEMULSIFICATION, CATARACT, WITH STANDARD INTRAOCULAR LENS IMPLANT INSERTION;  Surgeon: Mary Guy MD;  Location: [facility] OR    REPAIR RUPTURED GLOBE Left 12/07/2024    Procedure: LEFT RUPTURED GLOBE REPAIR, IRIS REPOSITIONING;  Surgeon: Yves Huff MD;  Location:  OR    [facility] LAP,INGUINAL HERNIA REPR,RECUR         Prior to Admission Medications  Current Outpatient Medications   Medication Sig Dispense Refill    cycloSPORINE (RESTASIS) 0.05 % ophthalmic emulsion Place 1 drop Into the left eye 2 times daily.      fluvoxaMINE (LUVOX) 50 MG tablet Take 50 mg by mouth at bedtime.      melatonin 3 MG tablet Take 1 tablet by mouth daily. At bedtime      moxifloxacin (VIGAMOX) 0.5 % ophthalmic solution Three times a day for a week, then two times a day for a week, then daily for a week, then stop (Patient taking differently: Place 1 drop Into the left eye 3 times daily. Three times a day for a week, then two times a day for a week, then daily for a week, then stop) 3 mL 0    risperiDONE (RISPERDAL) 1 MG tablet Take 1 mg by mouth at bedtime. 1.5 mg at bedtime      risperiDONE (RISPERDAL) 2 MG tablet Take 2 mg by mouth every morning.      LORazepam (ATIVAN) 1 MG tablet Take 1-2 tablets by mouth 2 times daily as needed for anxiety.      ofloxacin (OCUFLOX) 0.3 % ophthalmic solution Place 1-2 drops Into the left eye 4 times daily. (Patient not taking: Reported on 4/30/2025) 10 mL 11       Allergies  No Known Allergies    Social History  Social History     Socioeconomic History    Marital status: Single     Spouse name: Not on file    Number of children: Not on file    Years of education: Not on file    Highest education level: Not on file   Occupational History    Not on file   Tobacco Use    Smoking status: Never     Passive exposure: Never    Smokeless tobacco: Never   Substance and Sexual Activity    Alcohol use: Never     Drug use: Never    Sexual activity: Not on file   Other Topics Concern    Parent/sibling w/ CABG, MI or angioplasty before 65F 55M? Not Asked   Social History Narrative    Not on file     Social Drivers of Health     Financial Resource Strain: Not At Risk (5/1/2024)    Received from [facility]    Financial Resource Strain     Is it hard for you to pay for the very basics like food, housing, medical care or heating?: No   Food Insecurity: Not At Risk (5/1/2024)    Received from [facility]    Food Insecurity     Does your food run out before you have the money to buy more?: No   Transportation Needs: Not At Risk (5/1/2024)    Received from [facility]    Transportation Needs     Does a lack of transportation keep you from your medical appointments or from getting your medications?: No   Physical Activity: Not on file   Stress: Not on file   Social Connections: Not on file   Interpersonal Safety: Low Risk  (12/12/2024)    Interpersonal Safety     Do you feel physically and emotionally safe where you currently live?: Yes     Within the past 12 months, have you been hit, slapped, kicked or otherwise physically hurt by someone?: No     Within the past 12 months, have you been humiliated or emotionally abused in other ways by your partner or ex-partner?: No   Housing Stability: Not on file       Family History  Family History   Problem Relation Age of Onset    Deep Vein Thrombosis (DVT) Maternal Grandmother     Factor V Leiden deficiency Maternal Grandmother     Anesthesia Reaction No family hx of        Review of Systems  The complete review of systems is negative other than noted in the HPI or here.     Anesthesia Evaluation   Pt has had prior anesthetic.     History of anesthetic complications  - PONV.      ROS/MED HX  ENT/Pulmonary: Comment: Hx left eye injury Dec 2024, s/p ruptured globe repair and iris repositioning.      (+)                              recent URI, resolved,      (-) tobacco use,  asthma and sleep apnea   Neurologic:     (+)       seizures, last seizure: single seizure at 2 y/o. No recurrence.,                 Developmental delay,    (-) no CVA   Cardiovascular:       METS/Exercise Tolerance: >4 METS    Hematologic:  - neg hematologic  ROS  (-) history of blood clots and history of blood transfusion   Musculoskeletal:  - neg musculoskeletal ROS     GI/Hepatic: Comment: Chronic constipation    Hx rectal prolapse     (-) GERD and liver disease   Renal/Genitourinary:  - neg Renal ROS  (-) renal disease   Endo:  - neg endo ROS  (-) Type II DM   Psychiatric/Substance Use: Comment: Autism  OCD        Infectious Disease:  - neg infectious disease ROS  (-) MRSA   Malignancy:  - neg malignancy ROS  (-) malignancy   Other:  - neg other ROS          Virtual visit -  No vitals were obtained    Physical Exam  Constitutional: Awake, alert, cooperative, no apparent distress, and appears stated age.  HENT: Normocephalic  Respiratory: non labored breathing   Neurologic: Awake, alert, oriented to name, place and time.   Neuropsychiatric: Calm, cooperative. Normal affect.      Prior Labs/Diagnostic Studies   All pertinent records, results, labs and imaging personally reviewed         Assessment    Guilherme Silverio is a 34 year old male seen as a PAC referral for risk assessment and optimization for anesthesia.    Plan/Recommendations  Pt will be optimized for the proposed procedure.  See below for details on the assessment, risk, and preoperative recommendations    NEUROLOGY  - No history of TIA, CVA   - Hx single seizure at 2 y/o. No recurrence.   -Post Op delirium risk factors:  History of pre-existing cognitive dysfunction    EENT  - No current airway concerns.  Will need to be reassessed day of surgery.  Mallampati: Unable to assess  TM: Unable to assess  - Hx left eye injury Dec 2024, s/p ruptured globe repair and iris repositioning.    CARDIAC  - No history of CAD, Hypertension, and Afib  - METS  (Metabolic Equivalents)  Patient performs 4 or more METS exercise without symptoms             Total Score: 0      RCRI-Very low risk: Class 1 0.4% complication rate             Total Score: 0        PULMONARY  JANICE Low Risk             Total Score: 1    JANICE: Male      - Denies asthma or inhaler use  - Tobacco History    History   Smoking Status    Never   Smokeless Tobacco    Never       GI  - Denies GERD  PONV Medium Risk  Total Score: 2           1 AN PONV: Patient is not a current smoker    1 AN PONV: Patient has history of PONV          ENDOCRINE    - BMI: Estimated body mass index is 24.41 kg/m  as calculated from the following:    Height as of this encounter: 1.829 m (6').    Weight as of this encounter: 81.6 kg (180 lb).  Healthy Weight (BMI 18.5-24.9)  - No history of Diabetes Mellitus    HEME  VTE Low Risk 0.5%             Total Score: 2    VTE: Male      - No history of abnormal bleeding or antiplatelet use.      MSK  Patient is NOT Frail             Total Score: 0          PSYCH  - Autism  - OCD  - Cognitive impairment. Ambulatory, speaks in full sentences.      The patient is aware that the final anesthesia plan will be decided by the assigned anesthesia provider on the date of service.      The patient is optimized for their procedure. AVS with information on surgery time/arrival time, meds and NPO status given by nursing staff. No further diagnostic testing indicated.    Please refer to the physical examination documented by the anesthesiologist in the anesthesia record on the day of surgery.    Video-Visit Details    Type of service:  Video Visit    Provider received verbal consent for a Video Visit from the patient? Yes   Video Start Time:  0919  Video End Time: 0928    Originating Location (pt. Location): Home    Distant Location (provider location):  Off-site  Mode of Communication:  Video Conference via [employer]  On the day of service:     Prep time: 14 minutes  Visit time: 9 minutes  Documentation  time: 11 minutes  ------------------------------------------  Total time: 34 minutes      Tamiko Anderson PA-C  Preoperative Assessment Center  [facility]  Clinic and Surgery Center  Phone: [phone]  Fax: [phone]

## 2025-04-30 NOTE — PATIENT INSTRUCTIONS
Preparing for Your Surgery      Name:  Guilherme Silverio   MRN:  [id number]   :  1990   Today's Date:  2025       The [place] [employer] [address] Construction Project                                Timeline 2025 -2025    This project will affect travel to the [facility], as well as the [facility].      Please check the [facility] [address] project website for the most up to date information and give yourself additional time to reach your destination.              Arriving for surgery:  Surgery date:  25  Arrival time:  11:00 am  Surgery time: 12:00 pm    Restrictions due to COVID 19:    Please maintain social distance.  Masking is optional        parking is available for anyone with mobility limitations or disabilities. (Monday- Friday 7 am- 5 pm)    Please come to:    [facility]  [address] [place] [id number]    Check in on the 5th floor, Ambulatory Surgery Center.    What can I eat or drink?    -  You may eat and drink normally until 8 hours before arrival time  (Until 2:30 am)  -  You may have clear liquids until 2 hours before arrival time  (Until 8:30 am)    Examples of clear liquids:  Water  Clear broth  Juices (apple, white grape, white cranberry  and cider) without pulp  Noncarbonated, powder based beverages  (lemonade and Hemal-Aid)  Sodas (Sprite, 7-Up, ginger ale and seltzer)  Coffee or tea (without milk or cream)  Gatorade    --No alcohol or cannabis products for at least 24 hours before surgery    Which medicines can I take?    Hold Aspirin for 7 days before surgery.   Hold Multivitamins for 7 days before surgery.  Hold Supplements for 7 days before surgery.  Hold Ibuprofen (Advil, Motrin) for 1 day before surgery--unless otherwise directed by surgeon.  Hold Naproxen (Aleve) for 4 days before surgery.      -  PLEASE TAKE the following medications per your usual  routine:  Eye drops as scheduled  Fluvoxamine (Luvox) the night before procedure  Lorazepam (Ativan) if needed  Melatonin the night before procedure  Risperidone (Risperdal)      How do I prepare myself?  - Please take 2 showers (one the night prior to surgery and one the morning of surgery) using Scrubcare or Hibiclens soap.    Use this soap only from the neck to your toes. Avoid genital area      Leave the soap on your skin for one minute--then rinse thoroughly.      You may use your own shampoo and conditioner; no other hair products.   - Please remove all jewelry and body piercings.  - No lotions, deodorants or fragrance.  - No makeup or fingernail polish.   - Bring your ID and insurance card.    -If you have a Deep Brain Stimulator, a Spinal Cord Stimulator or any implanted Neuro device you must bring the remote to the Surgery Center          ALL PATIENTS ARE REQUIRED TO HAVE A RESPONSIBLE ADULT TO DRIVE AND BE IN ATTENDANCE WITH THEM FOR 24 HOURS FOLLOWING SURGERY       Covid testing policy as of 12/06/2022  Your surgeon will notify and schedule you for a COVID test if one is needed before surgery--please direct any questions or COVID symptoms to your surgeon      Questions or Concerns:    -For questions regarding the day of surgery please contact the Ambulatory Surgery Center at [phone].    -If you have health changes between today and your surgery please contact your surgeon.     For questions after surgery please call your surgeons office.

## 2025-04-30 NOTE — ANESTHESIA PREPROCEDURE EVALUATION
Anesthesia Pre-Procedure Evaluation    Patient: Guilherme Silverio   MRN: [id number] : 1990        Procedure : Procedure(s):  LEFT EYE REMOVAL, SUTURE, EYE, POSTPROCEDURAL  BILATERAL EXAM UNDER ANESTHESIA          Past Medical History:   Diagnosis Date    Acne     Autism     Constipation     Corneal laceration of left eye 2024    Development delay     Diarrhea     Rectal prolapse       Past Surgical History:   Procedure Laterality Date    DENTAL SURGERY      HC TOOTH EXTRACTION W/FORCEP      PE TUBES      PHACOEMULSIFICATION WITH STANDARD INTRAOCULAR LENS IMPLANT Left 2024    Procedure: PHACOEMULSIFICATION, CATARACT, WITH STANDARD INTRAOCULAR LENS IMPLANT INSERTION;  Surgeon: Mary Guy MD;  Location: [facility] OR    REPAIR RUPTURED GLOBE Left 2024    Procedure: LEFT RUPTURED GLOBE REPAIR, IRIS REPOSITIONING;  Surgeon: Yves Huff MD;  Location: [facility] OR    [facility] LAP,INGUINAL HERNIA REPR,RECUR        No Known Allergies   Social History     Tobacco Use    Smoking status: Never     Passive exposure: Never    Smokeless tobacco: Never   Substance Use Topics    Alcohol use: Never      Wt Readings from Last 1 Encounters:   25 81.6 kg (180 lb)        Anesthesia Evaluation   Pt has had prior anesthetic.     History of anesthetic complications  - PONV.      ROS/MED HX  ENT/Pulmonary: Comment: Hx left eye injury Dec 2024, s/p ruptured globe repair and iris repositioning.      (+)                              recent URI, resolved,      (-) tobacco use, asthma and sleep apnea   Neurologic:     (+)       seizures, last seizure: single seizure at 2 y/o. No recurrence.,                 Developmental delay,    (-) no CVA   Cardiovascular:       METS/Exercise Tolerance: >4 METS    Hematologic:  - neg hematologic  ROS  (-) history of blood clots and history of blood transfusion   Musculoskeletal:  - neg musculoskeletal ROS     GI/Hepatic: Comment: Chronic constipation    Hx rectal prolapse     (-)  "GERD and liver disease   Renal/Genitourinary:  - neg Renal ROS  (-) renal disease   Endo:  - neg endo ROS  (-) Type II DM   Psychiatric/Substance Use: Comment: Autism  OCD        Infectious Disease:  - neg infectious disease ROS  (-) MRSA   Malignancy:  - neg malignancy ROS  (-) malignancy   Other:  - neg other ROS          Physical Exam    Airway   unable to assess   Comment: +overbite         Respiratory Devices and Support         Dental       (+) Multiple visibly decayed, broken teeth      Cardiovascular   cardiovascular exam normal          Pulmonary   pulmonary exam normal                OUTSIDE LABS:  CBC: No results found for: \"WBC\", \"HGB\", \"HCT\", \"PLT\"  BMP: No results found for: \"NA\", \"POTASSIUM\", \"CHLORIDE\", \"CO2\", \"BUN\", \"CR\", \"GLC\"  COAGS: No results found for: \"PTT\", \"INR\", \"FIBR\"  POC: No results found for: \"BGM\", \"HCG\", \"HCGS\"  HEPATIC: No results found for: \"ALBUMIN\", \"PROTTOTAL\", \"ALT\", \"AST\", \"GGT\", \"ALKPHOS\", \"BILITOTAL\", \"BILIDIRECT\", \"NICKI\"  OTHER: No results found for: \"PH\", \"LACT\", \"A1C\", \"ORION\", \"PHOS\", \"MAG\", \"LIPASE\", \"AMYLASE\", \"TSH\", \"T4\", \"T3\", \"CRP\", \"SED\"    Anesthesia Plan    ASA Status:  2       Anesthesia Type: General.     - Airway: LMA   Induction: Intravenous, Propofol.   Maintenance: Balanced.        Consents    Anesthesia Plan(s) and associated risks, benefits, and realistic alternatives discussed. Questions answered and patient/representative(s) expressed understanding.     - Discussed: Risks, Benefits and Alternatives for BOTH SEDATION and the PROCEDURE were discussed     - Discussed with:       - Extended Intubation/Ventilatory Support Discussed: No.      - Patient is DNR/DNI Status: No     Use of blood products discussed: No .     Postoperative Care    Pain management: IV analgesics, Oral pain medications, Multi-modal analgesia.   PONV prophylaxis: Dexamethasone or Solumedrol, Ondansetron (or other 5HT-3), Background Propofol Infusion     Comments:               Oniel " MD Venkata    Clinically Significant Risk Factors Present on Admission

## 2025-05-01 ENCOUNTER — ANESTHESIA (OUTPATIENT)
Dept: SURGERY | Facility: AMBULATORY SURGERY CENTER | Age: 35
End: 2025-05-01
Payer: COMMERCIAL

## 2025-05-01 ENCOUNTER — HOSPITAL ENCOUNTER (OUTPATIENT)
Facility: AMBULATORY SURGERY CENTER | Age: 35
Discharge: HOME | End: 2025-05-01
Attending: OPHTHALMOLOGY
Payer: COMMERCIAL

## 2025-05-01 VITALS
HEIGHT: 72 IN | BODY MASS INDEX: 24.38 KG/M2 | HEART RATE: 65 BPM | OXYGEN SATURATION: 95 % | DIASTOLIC BLOOD PRESSURE: 70 MMHG | RESPIRATION RATE: 14 BRPM | SYSTOLIC BLOOD PRESSURE: 107 MMHG | WEIGHT: 180 LBS | TEMPERATURE: 97.4 F

## 2025-05-01 DIAGNOSIS — S05.32XD CORNEAL LACERATION OF LEFT EYE, SUBSEQUENT ENCOUNTER: Primary | ICD-10-CM

## 2025-05-01 PROCEDURE — 15851 REMOVAL SUTR/STAPLE REQ ANES: CPT | Mod: GC | Performed by: OPHTHALMOLOGY

## 2025-05-01 PROCEDURE — 15851 REMOVAL SUTR/STAPLE REQ ANES: CPT

## 2025-05-01 RX ORDER — PROPOFOL 10 MG/ML
INJECTION, EMULSION INTRAVENOUS PRN
Status: DISCONTINUED | OUTPATIENT
Start: 2025-05-01 | End: 2025-05-01

## 2025-05-01 RX ORDER — BALANCED SALT SOLUTION 6.4; .75; .48; .3; 3.9; 1.7 MG/ML; MG/ML; MG/ML; MG/ML; MG/ML; MG/ML
SOLUTION OPHTHALMIC PRN
Status: DISCONTINUED | OUTPATIENT
Start: 2025-05-01 | End: 2025-05-01 | Stop reason: HOSPADM

## 2025-05-01 RX ORDER — SODIUM CHLORIDE, SODIUM LACTATE, POTASSIUM CHLORIDE, CALCIUM CHLORIDE 600; 310; 30; 20 MG/100ML; MG/100ML; MG/100ML; MG/100ML
INJECTION, SOLUTION INTRAVENOUS CONTINUOUS PRN
Status: DISCONTINUED | OUTPATIENT
Start: 2025-05-01 | End: 2025-05-01

## 2025-05-01 RX ORDER — HYDROMORPHONE HYDROCHLORIDE 1 MG/ML
0.4 INJECTION, SOLUTION INTRAMUSCULAR; INTRAVENOUS; SUBCUTANEOUS EVERY 5 MIN PRN
Status: CANCELLED | OUTPATIENT
Start: 2025-05-01

## 2025-05-01 RX ORDER — MEPERIDINE HYDROCHLORIDE 25 MG/ML
12.5 INJECTION INTRAMUSCULAR; INTRAVENOUS; SUBCUTANEOUS EVERY 5 MIN PRN
Status: CANCELLED | OUTPATIENT
Start: 2025-05-01

## 2025-05-01 RX ORDER — FENTANYL CITRATE 50 UG/ML
50 INJECTION, SOLUTION INTRAMUSCULAR; INTRAVENOUS EVERY 5 MIN PRN
Status: CANCELLED | OUTPATIENT
Start: 2025-05-01

## 2025-05-01 RX ORDER — ONDANSETRON 2 MG/ML
4 INJECTION INTRAMUSCULAR; INTRAVENOUS EVERY 30 MIN PRN
Status: CANCELLED | OUTPATIENT
Start: 2025-05-01

## 2025-05-01 RX ORDER — OXYCODONE HYDROCHLORIDE 5 MG/1
10 TABLET ORAL
Status: CANCELLED | OUTPATIENT
Start: 2025-05-01

## 2025-05-01 RX ORDER — LABETALOL HYDROCHLORIDE 5 MG/ML
10 INJECTION, SOLUTION INTRAVENOUS
Status: CANCELLED | OUTPATIENT
Start: 2025-05-01

## 2025-05-01 RX ORDER — SODIUM CHLORIDE, SODIUM LACTATE, POTASSIUM CHLORIDE, CALCIUM CHLORIDE 600; 310; 30; 20 MG/100ML; MG/100ML; MG/100ML; MG/100ML
INJECTION, SOLUTION INTRAVENOUS CONTINUOUS
Status: CANCELLED | OUTPATIENT
Start: 2025-05-01

## 2025-05-01 RX ORDER — ONDANSETRON 4 MG/1
4 TABLET, ORALLY DISINTEGRATING ORAL EVERY 30 MIN PRN
Status: CANCELLED | OUTPATIENT
Start: 2025-05-01

## 2025-05-01 RX ORDER — NALOXONE HYDROCHLORIDE 0.4 MG/ML
0.1 INJECTION, SOLUTION INTRAMUSCULAR; INTRAVENOUS; SUBCUTANEOUS
Status: CANCELLED | OUTPATIENT
Start: 2025-05-01

## 2025-05-01 RX ORDER — DEXAMETHASONE SODIUM PHOSPHATE 10 MG/ML
4 INJECTION, SOLUTION INTRAMUSCULAR; INTRAVENOUS
Status: CANCELLED | OUTPATIENT
Start: 2025-05-01

## 2025-05-01 RX ORDER — OXYCODONE HYDROCHLORIDE 5 MG/1
5 TABLET ORAL
Status: CANCELLED | OUTPATIENT
Start: 2025-05-01

## 2025-05-01 RX ORDER — FENTANYL CITRATE 50 UG/ML
25 INJECTION, SOLUTION INTRAMUSCULAR; INTRAVENOUS
Status: CANCELLED | OUTPATIENT
Start: 2025-05-01

## 2025-05-01 RX ORDER — MOXIFLOXACIN 5 MG/ML
1 SOLUTION/ DROPS OPHTHALMIC 3 TIMES DAILY
Qty: 3 ML | Refills: 0 | Status: SHIPPED | OUTPATIENT
Start: 2025-05-01 | End: 2025-05-04

## 2025-05-01 RX ORDER — FENTANYL CITRATE 50 UG/ML
INJECTION, SOLUTION INTRAMUSCULAR; INTRAVENOUS PRN
Status: DISCONTINUED | OUTPATIENT
Start: 2025-05-01 | End: 2025-05-01

## 2025-05-01 RX ORDER — PROPARACAINE HYDROCHLORIDE 5 MG/ML
1 SOLUTION/ DROPS OPHTHALMIC ONCE
Status: COMPLETED | OUTPATIENT
Start: 2025-05-01 | End: 2025-05-01

## 2025-05-01 RX ORDER — FENTANYL CITRATE 50 UG/ML
25 INJECTION, SOLUTION INTRAMUSCULAR; INTRAVENOUS EVERY 5 MIN PRN
Status: CANCELLED | OUTPATIENT
Start: 2025-05-01

## 2025-05-01 RX ORDER — TETRACAINE HYDROCHLORIDE 5 MG/ML
SOLUTION OPHTHALMIC PRN
Status: DISCONTINUED | OUTPATIENT
Start: 2025-05-01 | End: 2025-05-01 | Stop reason: HOSPADM

## 2025-05-01 RX ORDER — HYDROMORPHONE HYDROCHLORIDE 1 MG/ML
0.2 INJECTION, SOLUTION INTRAMUSCULAR; INTRAVENOUS; SUBCUTANEOUS EVERY 5 MIN PRN
Status: CANCELLED | OUTPATIENT
Start: 2025-05-01

## 2025-05-01 RX ADMIN — FENTANYL CITRATE 25 MCG: 50 INJECTION, SOLUTION INTRAMUSCULAR; INTRAVENOUS at 11:09

## 2025-05-01 RX ADMIN — PROPOFOL 30 MG: 10 INJECTION, EMULSION INTRAVENOUS at 11:05

## 2025-05-01 RX ADMIN — PROPOFOL 30 MG: 10 INJECTION, EMULSION INTRAVENOUS at 10:59

## 2025-05-01 RX ADMIN — PROPOFOL 30 MG: 10 INJECTION, EMULSION INTRAVENOUS at 11:02

## 2025-05-01 RX ADMIN — PROPARACAINE HYDROCHLORIDE 1 DROP: 5 SOLUTION/ DROPS OPHTHALMIC at 09:46

## 2025-05-01 RX ADMIN — SODIUM CHLORIDE, SODIUM LACTATE, POTASSIUM CHLORIDE, CALCIUM CHLORIDE: 600; 310; 30; 20 INJECTION, SOLUTION INTRAVENOUS at 10:53

## 2025-05-01 NOTE — DISCHARGE INSTRUCTIONS
Post-Operative Instructions     FIRST 24 HOURS AFTER SURGERY:  You are allowed to Tylenol (acetaminophen) 650mg every four hours as needed for pain unless you have liver disease or are allergic to Tylenol..  Continue taking your regular medications and eye drops in the non-operative eye.  Do not remove the metal or plastic shield unless otherwise instructed by your surgeon.  Do not operate a car, motorcycle, or machinery for 24 hours after surgery.  Call ED immediately if you have SEVERE PAIN unrelieved with Tylenol. And  ask for the resident on call.     MEDICATION INSTRUCTIONS:  -Place 1 drop of moxifloxacin in the operative eye 3 times per day for 3 days  -If you feel your eyes are dry and itchy, you can use regular lubricating drops for one month after the surgery. These eye drops can be found over the counter Brand names example: Systane, Refresh. Please choose preservative free drops. To be used four times a day and as needed for 1 month  -Instilling the eye drops directly into the eye.    -If you had previously any glaucoma eye drops, You can remove the cover and instill the drops as prescribed before and after the procedure      GENERAL INSTRUCTIONS:  Hygiene of the operated eye  Wash your hands thoroughly before caring for the eye.  If the lids are sticky or itchy in the morning, debris, or matter can be gently wiped away with a cotton ball moistened with tap water. DO NOT press on the lids or eyeball.     FIVE MINUTES APART. If ointment is prescribed, it should be applied last.  If you have been taking medications in the non-operated eye, continue as they were prescribed.  If you take medications by mouth for a medical problem, continue as they were prescribed (unless otherwise instructed by physician)    EYE PROTECTION  From the time of surgery until the time of your post-operative day 1 appointment, wear the eye shield at all times. Then, wear it whenever sleeping, for 1 week.  Protective sunglasses may  be worn as needed for your comfort, and are suggested for outdoor activities.    ACTIVITIES  Avoid vigorous exertion and heavy lifting for the first week after surgery  You may take a bath or shower, but avoid getting water directly into your eye for one week after surgery, usually by keeping your eyes closed during the bath.  You should discuss driving and traveling with your surgeon. You may ride in a car and fly in an airplane unless otherwise instructed.  Avoid swimming or using a hot tube/sauna/pool/lake for 2 weeks after the surgery.      WHAT TO EXPECT:  Mild irritation and discomfort are normal.    Call the doctor if you experience any of the following:  Severe eye pain  Nausea  Vomiting  Severe headache   [facility]  Home Care Following Anesthesia  For 24 hours after surgery:  Get plenty of rest.  A responsible adult must stay with you for at least 24 hours after you leave the surgery center.  Do not drive or use heavy equipment.  If you have weakness or tingling, don't drive or use heavy equipment until this feeling goes away.   Do not drink alcohol.   Avoid strenuous or risky activities.  Ask for help when climbing stairs.  You may feel lightheaded.  IF so, sit for a few minutes before standing.  Have someone help you get up.   If you have nausea (feel sick to your stomach): Drink only clear liquids such as apple juice, ginger ale, broth or 7-Up.  Rest may also help.  Be sure to drink enough fluids.  Move to a regular diet as you feel able.   You may have a slight fever.  Call the doctor if your fever is over 100 F (37.7 C) (taken under the tongue) or lasts longer than 24 hours.  You may have a dry mouth, a sore throat, muscle aches or trouble sleeping. These should go away after 24 hours.  Do not make important or legal decisions.   It is recommended to avoid smoking.               Tips for taking pain medications  To get the best pain relief possible, remember these  points:  Take pain medications as directed, before pain becomes severe.  Pain medication can upset your stomach: taking it with food may help.  Constipation is a common side effect of pain medication. Drink plenty of  fluids.  Eat foods high in fiber. Take a stool softener if recommended by your doctor or pharmacist.  Do not drink alcohol, drive or operate machinery while taking pain medications.  Ask about other ways to control pain, such as with heat, ice or relaxation.    Tylenol/Acetaminophen Consumption    If you feel your pain relief is insufficient, you may take Tylenol/Acetaminophen in addition to your narcotic pain medication.   Be careful not to exceed 4,000 mg of Tylenol/Acetaminophen in a 24 hour period from all sources.  If you are taking extra strength Tylenol/acetaminophen (500 mg), the maximum dose is 8 tablets in 24 hours.  If you are taking regular strength acetaminophen (325 mg), the maximum dose is 12 tablets in 24 hours.    Call a doctor for any of the following:  Signs of infection (fever, growing tenderness at the surgery site, a large amount of drainage or bleeding, severe pain, foul-smelling drainage, redness, swelling).  It has been over 8 to 10 hours since surgery and you are still not able to urinate (pass water).  Headache for over 24 hours.  Numbness, tingling or weakness the day after surgery (if you had spinal anesthesia).  Signs of Covid-19 infection (temperature over 100 degrees, shortness of breath, cough, loss of taste/smell, generalized body aches, persistent headache, chills, sore throat, nausea/vomiting/diarrhea)    Your doctor is:  Dr. Mary Guy, Ophthalmology: [phone]                    After hours and weekends call the hospital @ [phone] and ask for the resident on call for:  Ophthalmology  For emergency care, call the:  [facility]:  [phone] (TTY for hearing impaired: [phone])

## 2025-05-01 NOTE — OP NOTE
Operative  Report    Patient Name: Guilherme Silverio    MRN: [id number]    Date of Surgery: 5/1/2025    Surgeon: Mary Guy M.D    Assistant surgeon: Freeman maddox    Preoperative Diagnosis: Loose sutures left eye. History of penetrating ocular trauma left eye    Postoperative Diagnosis: Same    Procedure: corneal suture removal left eye    Exam under anesthesia left eye    Anesthesia Type: mac    Estimated Blood Loss: Trace    Complications: None    INDICATIONS FOR PROCEDURE: Patient has a history of coreal sutures following a globe injury. Patient is not cooperative in clinic. Here for exam under anesthesia    DETAILS OF THE PROCEDURE: Patient was identified in the pre operative area and given pre operative eye drops. The patient was then brought into the operating room and intravenous sedation was begun after a time out was performed. The patient was prepped and draped in the usual sterile ophthalmic fashion.     -10x  10-0 nylon sutures removed with needle and forceps    Exam under anesthesia both eyes    Cornea: central corneal scar right eye, clear cornea left eye  AC: deep each eye  Lens: PCIOL left eye, clear lens right eye  Retina: flat no RD each eye, clear vitreous each eye. Retinal scar noted in the left eye.    IOP: 11/11    Post operative ointment was applied and the eye was patched and shielded. Patient tolerated procedure and was brought to the recovery area in good condition. Patient will follow in the eye clinic in one day.

## 2025-05-01 NOTE — ANESTHESIA POSTPROCEDURE EVALUATION
Patient: Guilherme Silverio    Procedure: Procedure(s):  LEFT EYE REMOVAL, SUTURE, EYE, POSTPROCEDURAL  BILATERAL EXAM UNDER ANESTHESIA       Anesthesia Type:  General    Note:  Disposition: Outpatient   Postop Pain Control: Uneventful            Sign Out: Well controlled pain   PONV: No   Neuro/Psych: Uneventful            Sign Out: Acceptable/Baseline neuro status   Airway/Respiratory: Uneventful            Sign Out: Acceptable/Baseline resp. status   CV/Hemodynamics: Uneventful            Sign Out: Acceptable CV status; No obvious hypovolemia; No obvious fluid overload   Other NRE: NONE   DID A NON-ROUTINE EVENT OCCUR? No       Last vitals:  Vitals Value Taken Time   /71 05/01/25 1135   Temp 36.3  C (97.4  F) 05/01/25 1130   Pulse 79 05/01/25 1135   Resp 14 05/01/25 1130   SpO2 94 % 05/01/25 1139   Vitals shown include unfiled device data.    Electronically Signed By: Fide Lyon MD  May 1, 2025  11:45 AM

## 2025-05-01 NOTE — BRIEF OP NOTE
[facility] [place]    Brief Operative Note    Pre-operative diagnosis: Corneal laceration of left eye, subsequent encounter [S05.32XD]  Post-operative diagnosis Same as pre-operative diagnosis    Procedure: LEFT EYE REMOVAL, SUTURE, EYE, POSTPROCEDURAL, Left - Eye  BILATERAL EXAM UNDER ANESTHESIA, Bilateral - Eye    Surgeon: Surgeons and Role:     * Mary Guy MD - Primary     * Eliud Eric MD - Resident - Assisting     * Franky Acuña MD - Fellow - Assisting  Anesthesia: MAC   Estimated Blood Loss: None    Drains: None  Specimens: * No specimens in log *  Findings:   None.  Complications: None.  Implants: * No implants in log *

## 2025-05-01 NOTE — ANESTHESIA CARE TRANSFER NOTE
Patient: Guilherme Silverio    Procedure: Procedure(s):  LEFT EYE REMOVAL, SUTURE, EYE, POSTPROCEDURAL  BILATERAL EXAM UNDER ANESTHESIA       Diagnosis: Corneal laceration of left eye, subsequent encounter [S05.32XD]  Diagnosis Additional Information: No value filed.    Anesthesia Type:   General     Note:      Level of Consciousness: awake  Oxygen Supplementation: room air    Independent Airway: airway patency satisfactory and stable        Patient transferred to: Phase II    Handoff Report: Identifed the Patient, Identified the Reponsible Provider, Reviewed the pertinent medical history, Discussed the surgical course, Reviewed Intra-OP anesthesia mangement and issues during anesthesia, Set expectations for post-procedure period and Allowed opportunity for questions and acknowledgement of understanding  Vitals:  Vitals Value Taken Time   BP     Temp     Pulse     Resp     SpO2 90 % 05/01/25 1116   Vitals shown include unfiled device data.    Electronically Signed By: TYLOR Jaquez CRNA  May 1, 2025  11:17 AM

## 2025-05-07 ENCOUNTER — OFFICE VISIT (OUTPATIENT)
Dept: OPHTHALMOLOGY | Facility: CLINIC | Age: 35
End: 2025-05-07
Attending: OPHTHALMOLOGY
Payer: COMMERCIAL

## 2025-05-07 DIAGNOSIS — S05.32XD CORNEAL LACERATION OF LEFT EYE, SUBSEQUENT ENCOUNTER: Primary | ICD-10-CM

## 2025-05-07 PROCEDURE — 99213 OFFICE O/P EST LOW 20 MIN: CPT | Performed by: OPHTHALMOLOGY

## 2025-05-07 ASSESSMENT — EXTERNAL EXAM - LEFT EYE: OS_EXAM: NORMAL

## 2025-05-07 ASSESSMENT — VISUAL ACUITY
OD_SC: 20/25
OS_SC: 20/25
METHOD: SNELLEN - LINEAR

## 2025-05-07 ASSESSMENT — EXTERNAL EXAM - RIGHT EYE: OD_EXAM: NORMAL

## 2025-05-07 NOTE — PROGRESS NOTES
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  This started months ago.  Associated symptoms include Negative for redness and tearing.             Comments    Moxi TID left eye   Eye is doing a lot better  Miryam Caba COT 10:30 AM May 7, 2025              Past ocular history   Prior eye surgery/laser/Trauma: -  CTL wearer:No  Glasses : *  Family Hx of eye disease: -    PMH     Past Medical History:   Diagnosis Date    Acne     Autism     Constipation     Corneal laceration of left eye 12/2024    Development delay     Diarrhea     Rectal prolapse        PSH     Past Surgical History:   Procedure Laterality Date    DENTAL SURGERY      EXAM UNDER ANESTHESIA EYE(S) Bilateral 5/1/2025    Procedure: BILATERAL EXAM UNDER ANESTHESIA;  Surgeon: Mary Guy MD;  Location: [facility] OR     TOOTH EXTRACTION W/FORCEP      PE TUBES      PHACOEMULSIFICATION WITH STANDARD INTRAOCULAR LENS IMPLANT Left 12/12/2024    Procedure: PHACOEMULSIFICATION, CATARACT, WITH STANDARD INTRAOCULAR LENS IMPLANT INSERTION;  Surgeon: Mary Guy MD;  Location: [facility] OR    REMOVE SUTURE EYE POST PROCEDURE Left 5/1/2025    Procedure: LEFT EYE REMOVAL, SUTURE, EYE, POSTPROCEDURAL;  Surgeon: Mary Guy MD;  Location: [facility] OR    REPAIR RUPTURED GLOBE Left 12/07/2024    Procedure: LEFT RUPTURED GLOBE REPAIR, IRIS REPOSITIONING;  Surgeon: Yves Huff MD;  Location:  OR    [facility] LAP,INGUINAL HERNIA REPR,RECUR         Meds     Current Outpatient Medications   Medication Sig Dispense Refill    cycloSPORINE (RESTASIS) 0.05 % ophthalmic emulsion Place 1 drop Into the left eye 2 times daily.      fluvoxaMINE (LUVOX) 50 MG  tablet Take 50 mg by mouth at bedtime.      LORazepam (ATIVAN) 1 MG tablet Take 1-2 tablets by mouth 2 times daily as needed for anxiety.      melatonin 3 MG tablet Take 1 tablet by mouth daily. At bedtime      moxifloxacin (VIGAMOX) 0.5 % ophthalmic solution Three times a day for a week, then two times a day for a week, then daily for a week, then stop (Patient taking differently: Place 1 drop Into the left eye 3 times daily. Three times a day for a week, then two times a day for a week, then daily for a week, then stop) 3 mL 0    ofloxacin (OCUFLOX) 0.3 % ophthalmic solution Place 1-2 drops Into the left eye 4 times daily. 10 mL 11    risperiDONE (RISPERDAL) 1 MG tablet Take 1 mg by mouth at bedtime. 1.5 mg at bedtime      risperiDONE (RISPERDAL) 2 MG tablet Take 2 mg by mouth every morning.       No current facility-administered medications for this visit.     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  I personally obtained the chief complaint(s) and history of present illness.  I confirmed and edited as necessary the review of systems, past medical/surgical history, family history, social history, and examination findings as documented by others; and I examined the patient myself.  I personally reviewed the relevant tests, images, and reports as documented above.  I formulated and edited as necessary the assessment and plan and discussed the findings and management plan with the  patient and family. - Mary Guy MD

## 2025-05-07 NOTE — NURSING NOTE
Chief Complaints and History of Present Illnesses   Patient presents with    Corneal Evaluation     Chief Complaint(s) and History of Present Illness(es)       Corneal Evaluation              Laterality: left eye    Onset: months ago    Associated symptoms: Negative for redness and tearing              Comments    Moxi TID left eye   Eye is doing a lot better  Miryam GRAVES 10:30 AM May 7, 2025

## 2025-06-21 ENCOUNTER — HEALTH MAINTENANCE LETTER (OUTPATIENT)
Age: 35
End: 2025-06-21

## (undated) DEVICE — SU ETHILON 10-0 CS160-6 12" 9000G

## (undated) DEVICE — APPLICATORS COTTON TIP 6"X2 STERILE LF C15053-006

## (undated) DEVICE — GLOVE BIOGEL PI ULTRATOUCH G SZ 7.5 42175

## (undated) DEVICE — BLADE KNIFE BEAVER MICROSHARP GREEN 377515

## (undated) DEVICE — EYE SHIELD PLASTIC

## (undated) DEVICE — SYR 01ML LL W/O NDL LATEX FREE 309628

## (undated) DEVICE — LINEN TOWEL PACK X5 5464

## (undated) DEVICE — SOL WATER IRRIG 500ML BOTTLE 2F7113

## (undated) DEVICE — TAPE MICROPORE 2"X1.5YD 1530S-2

## (undated) DEVICE — SYR 03ML LL W/O NDL 309657

## (undated) DEVICE — NDL 30GA 0.5" 305106

## (undated) DEVICE — SPONGE SPEAR WECK CEL 6/PKG 0008680

## (undated) DEVICE — SOL WATER IRRIG 1000ML BOTTLE 2F7114

## (undated) DEVICE — PACK CATARACT UMMC

## (undated) DEVICE — SYR 05ML LL W/O NDL

## (undated) DEVICE — PACK CATARACT CUSTOM ASC SEY15CPUMC

## (undated) DEVICE — POSITIONER ARMBOARD FOAM 1PAIR LF FP-ARMB1

## (undated) DEVICE — EYE PREP BETADINE 5% SOLUTION 30ML 0065-0411-30

## (undated) DEVICE — EYE STRIP FLUORESCEIN TEST 1MG BIO-GLO HUO900-11

## (undated) DEVICE — SOL NACL 0.9% 10ML VIAL 0409-4888-02

## (undated) DEVICE — EYE KNIFE STILETTO VISITEC 1.1MM ANG 45DEG SIDEPORT 376620

## (undated) DEVICE — STRAP KNEE/BODY 31143004

## (undated) DEVICE — EYE SHIELD PLASTIC CLEAR UNIVERSAL K9-6050

## (undated) DEVICE — EYE CANN IRR 30GA  ANTERIOR CHAMBER 581273

## (undated) DEVICE — EYE LENS BNDG CONTACT ACUVUE OASYS W/HYDRACLEAR PLUS 14.0D P

## (undated) RX ORDER — PROPOFOL 10 MG/ML
INJECTION, EMULSION INTRAVENOUS
Status: DISPENSED
Start: 2024-12-12

## (undated) RX ORDER — FENTANYL CITRATE 50 UG/ML
INJECTION, SOLUTION INTRAMUSCULAR; INTRAVENOUS
Status: DISPENSED
Start: 2025-05-01

## (undated) RX ORDER — FENTANYL CITRATE 50 UG/ML
INJECTION, SOLUTION INTRAMUSCULAR; INTRAVENOUS
Status: DISPENSED
Start: 2024-12-07

## (undated) RX ORDER — ONDANSETRON 2 MG/ML
INJECTION INTRAMUSCULAR; INTRAVENOUS
Status: DISPENSED
Start: 2024-12-12

## (undated) RX ORDER — SODIUM CHLORIDE, SODIUM LACTATE, POTASSIUM CHLORIDE, CALCIUM CHLORIDE 600; 310; 30; 20 MG/100ML; MG/100ML; MG/100ML; MG/100ML
INJECTION, SOLUTION INTRAVENOUS
Status: DISPENSED
Start: 2024-12-07

## (undated) RX ORDER — FENTANYL CITRATE 50 UG/ML
INJECTION, SOLUTION INTRAMUSCULAR; INTRAVENOUS
Status: DISPENSED
Start: 2024-12-12

## (undated) RX ORDER — SCOLOPAMINE TRANSDERMAL SYSTEM 1 MG/1
PATCH, EXTENDED RELEASE TRANSDERMAL
Status: DISPENSED
Start: 2024-12-12

## (undated) RX ORDER — ONDANSETRON 2 MG/ML
INJECTION INTRAMUSCULAR; INTRAVENOUS
Status: DISPENSED
Start: 2024-12-07

## (undated) RX ORDER — DEXAMETHASONE SODIUM PHOSPHATE 4 MG/ML
INJECTION, SOLUTION INTRA-ARTICULAR; INTRALESIONAL; INTRAMUSCULAR; INTRAVENOUS; SOFT TISSUE
Status: DISPENSED
Start: 2024-12-07

## (undated) RX ORDER — DEXAMETHASONE SODIUM PHOSPHATE 4 MG/ML
INJECTION, SOLUTION INTRA-ARTICULAR; INTRALESIONAL; INTRAMUSCULAR; INTRAVENOUS; SOFT TISSUE
Status: DISPENSED
Start: 2024-12-12

## (undated) RX ORDER — GLYCOPYRROLATE 0.2 MG/ML
INJECTION INTRAMUSCULAR; INTRAVENOUS
Status: DISPENSED
Start: 2024-12-12

## (undated) RX ORDER — CYCLOPENTOLAT/TROPIC/PHENYLEPH 1%-1%-2.5%
DROPS (EA) OPHTHALMIC (EYE)
Status: DISPENSED
Start: 2024-12-07

## (undated) RX ORDER — DIPHENHYDRAMINE HYDROCHLORIDE 50 MG/ML
INJECTION INTRAMUSCULAR; INTRAVENOUS
Status: DISPENSED
Start: 2024-12-07